# Patient Record
Sex: MALE | Race: WHITE | Employment: FULL TIME | URBAN - METROPOLITAN AREA
[De-identification: names, ages, dates, MRNs, and addresses within clinical notes are randomized per-mention and may not be internally consistent; named-entity substitution may affect disease eponyms.]

---

## 2018-01-18 ENCOUNTER — ED HISTORICAL/CONVERTED ENCOUNTER (OUTPATIENT)
Dept: OTHER | Age: 38
End: 2018-01-18

## 2021-09-28 ENCOUNTER — OFFICE VISIT (OUTPATIENT)
Dept: HEMATOLOGY | Age: 41
End: 2021-09-28
Payer: COMMERCIAL

## 2021-09-28 VITALS
TEMPERATURE: 98.6 F | OXYGEN SATURATION: 97 % | RESPIRATION RATE: 22 BRPM | BODY MASS INDEX: 35.98 KG/M2 | SYSTOLIC BLOOD PRESSURE: 155 MMHG | DIASTOLIC BLOOD PRESSURE: 90 MMHG | WEIGHT: 257 LBS | HEIGHT: 71 IN | HEART RATE: 90 BPM

## 2021-09-28 DIAGNOSIS — R79.89 ELEVATED LFTS: Primary | ICD-10-CM

## 2021-09-28 DIAGNOSIS — E83.19 IRON OVERLOAD: ICD-10-CM

## 2021-09-28 PROCEDURE — 99204 OFFICE O/P NEW MOD 45 MIN: CPT | Performed by: NURSE PRACTITIONER

## 2021-09-28 NOTE — PROGRESS NOTES
Tanvi Spears is a 46 y.o. male    Chief Complaint   Patient presents with    New Patient     1. Have you been to the ER, urgent care clinic since your last visit? Hospitalized since your last visit? No     2. Have you seen or consulted any other health care providers outside of the [address] [place] [address] since your last visit? Include any pap smears or colon screening.   No     Visit Vitals  BP (!) 155/90   Pulse 90   Temp 98.6 °F (37 °C)   Resp 22   Ht 5' 11\" (1.803 m)   Wt 257 lb (116.6 kg)   SpO2 97%   BMI 35.84 kg/m²

## 2021-09-28 NOTE — PROGRESS NOTES
Syed Frederick MD, Caprice Connors MD      [facility], PAESPINOZA Arredondo, ACNP-BC     April S Antonella, [facility]   MADISON Evans, [facility]       Gaston [facility] 136    at [place] [address], [address] Nila Bearden  22.    [phone]    FAX: [address] [place] [address], [address]    [phone]    FAX: [phone]     Patient Care Team:  Yolie Betancourt as PCP - General (Physician Assistant)  Yolie Betancourt as PCP - [facility] Denise Provider  [facility], Not On File, MD    Patient Active Problem List   Diagnosis Code    Olecranon bursitis of right elbow M70.21    Hidradenitis L73.2     The clinicians listed above have asked me to see Kimberly Berumen in consultation regarding elevated liver enzymes and its management. No medical records were available for review when the patient was here for the appointment. The patient is a 46 y.o.  male who was found to have elevated liver enzymes in 8/2021. The patient states they were elevated, checked again and down a little. Patient states he was told his iron was high and sent here. Serologic evaluation for markers of chronic liver disease has either not been performed or the results are not available. Imaging of the liver was either not performed or the results are not available to me. An assessment of liver fibrosis with biopsy or elastography has not been performed. The patient had not started any new medications within 3 months preceding the elevation in liver chemistries. The patient has no symptoms which can be attributed to the liver disorder.     The patient has not experienced the following symptoms: pain in the right side over the liver, yellowing of the eyes or skin, itching or swelling of the abdomen. The patient completes all daily activities without any functional limitations. ASSESSMENT AND PLAN:  Elevated liver enzymes  Persistent elevation in of unclear etiology at this time. Serologic testing for causes of chronic liver disease was ordered. Will perform laboratory testing to monitor liver function and degree of liver injury. This included BMP, hepatic panel, CBC with platelet count and INR. Will perform imaging of the liver with ultrasound. The need to perform an assessment of liver fibrosis was discussed with the patient. The FibroScan can assess liver fibrosis and determine if a patient has advanced fibrosis or cirrhosis without the need for liver biopsy. This will be performed at the next office visit. If the FibroScan suggests advanced fibrosis then a liver biopsy should be considered. The FibroScan can be repeated annually or as often as clinically indicated to assess for fibrosis progression and/or regression. If the liver enzymes remain persistently elevated over the next 1-2 years a liver biopsy should be performed to ensure there is no ongoing chronic liver disease. Screening for hepatocellular carcinoma  HCC screening is not necessary if the patient has no evidence of cirrhosis. Treatment of other medical problems in patients with chronic liver disease  There are no contraindications for the patient to take most medications necessary for treatment of other medical issues. The patient can take any medications utilized for treatment of DM and/or statins to treat hypercholesterolemia. The patient does not consume alcohol on a daily basis.  Normal doses of acetaminophen, as recommended on the label of the bottle, are not hepatotoxic except in the setting of daily alcohol use, even in patients with cirrhosis and can be utilized for pain.    Counseling for alcohol in patients with chronic liver disease  The patient was counseled regarding alcohol consumption and the effect of alcohol on chronic liver disease. The patient does not consume any significant amount of alcohol. Vaccinations   The need for vaccination against viral hepatitis A and B will be assessed with serologic and instituted as appropriate. Routine vaccinations against other bacterial and viral agents can be performed as indicated. Annual flu vaccination should be administered if indicated. No Known Allergies  No current outpatient medications on file prior to visit. No current facility-administered medications on file prior to visit. FAMILY HISTORY:  His parents both  when he was young and he is unaware of any genetic or liver problems. SOCIAL HISTORY:  The patient has a long term girl friend. The patient has no children. The patient smokes 1 1/2 - 2 PPD. The patient drinks 0-2 beers/day. The patient currently works full time as a . PHYSICAL EXAMINATION:  Visit Vitals  BP (!) 155/90   Pulse 90   Temp 98.6 °F (37 °C)   Resp 22   Ht 5' 11\" (1.803 m)   Wt 257 lb (116.6 kg)   SpO2 97%   BMI 35.84 kg/m²     General: No acute distress. Eyes: Sclera anicteric. ENT: No oral lesions. Nodes: No adenopathy. Skin: No spider angiomata. No jaundice. No palmar erythema. Respiratory: Lungs clear to auscultation. Cardiovascular: Regular heart rate. No murmurs. No JVD. Abdomen: Soft non-tender. Liver size normal to percussion/palpation. Spleen not palpable. No obvious ascites. Extremities: No edema. No muscle wasting. No gross arthritic changes. Neurologic: Alert and oriented. Cranial nerves grossly intact. No asterixis. LABORATORY STUDIES:  Recent liver function panel, CBC with platelet count and BMP are not available. These studies will be performed. SEROLOGIES:  Not available or performed.  Testing was performed today.    LIVER HISTOLOGY:  Not available or performed    ENDOSCOPIC PROCEDURES:  Not available or performed    RADIOLOGY:  Not available or performed    OTHER TESTING:  Not available or performed    FOLLOW-UP:  All of the issues listed above in the assessment and plan were discussed with the patient. All questions were answered. The patient expressed a clear understanding of the above. [address] [place] [id number] in 4 weeks for FibroScan, to review all data and determine the treatment plan.     Wyatt Jhaveri [facility] Services[facility]-BC  Liver [place] [address], [address] [address]  22.  [phone]

## 2021-09-29 LAB
ACE SERPL-CCNC: 47 U/L (ref 14–82)
ALBUMIN SERPL-MCNC: 4.6 G/DL (ref 4–5)
ALP SERPL-CCNC: 101 IU/L (ref 44–121)
ALT SERPL-CCNC: 55 IU/L (ref 0–44)
AST SERPL-CCNC: 46 IU/L (ref 0–40)
BILIRUB DIRECT SERPL-MCNC: 0.17 MG/DL (ref 0–0.4)
BILIRUB SERPL-MCNC: 0.6 MG/DL (ref 0–1.2)
BUN SERPL-MCNC: 10 MG/DL (ref 6–20)
BUN/CREAT SERPL: 11 (ref 9–20)
CALCIUM SERPL-MCNC: 9.5 MG/DL (ref 8.7–10.2)
CERULOPLASMIN SERPL-MCNC: 16.3 MG/DL (ref 16–31)
CHLORIDE SERPL-SCNC: 102 MMOL/L (ref 96–106)
CO2 SERPL-SCNC: 23 MMOL/L (ref 20–29)
CREAT SERPL-MCNC: 0.89 MG/DL (ref 0.76–1.27)
ERYTHROCYTE [DISTWIDTH] IN BLOOD BY AUTOMATED COUNT: 13.1 % (ref 11.6–15.4)
FERRITIN SERPL-MCNC: 1870 NG/ML (ref 30–400)
GLUCOSE SERPL-MCNC: 160 MG/DL (ref 65–99)
HAV AB SER QL: POSITIVE
HBV CORE AB SERPL QL IA: NEGATIVE
HBV SURFACE AB SER QL: NON REACTIVE
HBV SURFACE AG SERPL QL IA: NEGATIVE
HCT VFR BLD AUTO: 44.7 % (ref 37.5–51)
HGB BLD-MCNC: 15.1 G/DL (ref 13–17.7)
INR PPP: 1 (ref 0.9–1.2)
IRON SATN MFR SERPL: >94 % (ref 15–55)
IRON SERPL-MCNC: 268 UG/DL (ref 38–169)
MCH RBC QN AUTO: 33.4 PG (ref 26.6–33)
MCHC RBC AUTO-ENTMCNC: 33.8 G/DL (ref 31.5–35.7)
MCV RBC AUTO: 99 FL (ref 79–97)
PLATELET # BLD AUTO: 228 X10E3/UL (ref 150–450)
POTASSIUM SERPL-SCNC: 4.1 MMOL/L (ref 3.5–5.2)
PROT SERPL-MCNC: 7.1 G/DL (ref 6–8.5)
PROTHROMBIN TIME: 10.3 SEC (ref 9.1–12)
RBC # BLD AUTO: 4.52 X10E6/UL (ref 4.14–5.8)
SODIUM SERPL-SCNC: 141 MMOL/L (ref 134–144)
TIBC SERPL-MCNC: <285 UG/DL (ref 250–450)
UIBC SERPL-MCNC: <17 UG/DL (ref 111–343)
WBC # BLD AUTO: 8.1 X10E3/UL (ref 3.4–10.8)

## 2021-09-30 LAB
ACTIN IGG SERPL-ACNC: 4 UNITS (ref 0–19)
C-ANCA TITR SER IF: NORMAL TITER
MITOCHONDRIA M2 IGG SER-ACNC: <20 UNITS (ref 0–20)
MYELOPEROXIDASE AB SER IA-ACNC: <9 U/ML (ref 0–9)
P-ANCA ATYPICAL TITR SER IF: NORMAL TITER
P-ANCA TITR SER IF: NORMAL TITER
PROTEINASE3 AB SER IA-ACNC: <3.5 U/ML (ref 0–3.5)

## 2021-10-07 ENCOUNTER — HOSPITAL ENCOUNTER (OUTPATIENT)
Dept: ULTRASOUND IMAGING | Age: 41
Discharge: HOME | End: 2021-10-07
Attending: NURSE PRACTITIONER
Payer: COMMERCIAL

## 2021-10-07 DIAGNOSIS — R79.89 ELEVATED LFTS: ICD-10-CM

## 2021-10-07 LAB
LAB DIRECTOR NAME PROVIDER: NORMAL
SERPINA1 GENE MUT ANL BLD/T: NORMAL
SERPINA1 GENE MUT TESTED BLD/T: NORMAL

## 2021-10-07 PROCEDURE — 76700 US EXAM ABDOM COMPLETE: CPT

## 2021-10-21 ENCOUNTER — TELEPHONE (OUTPATIENT)
Dept: HEMATOLOGY | Age: 41
End: 2021-10-21

## 2021-11-18 ENCOUNTER — OFFICE VISIT (OUTPATIENT)
Dept: HEMATOLOGY | Age: 41
End: 2021-11-18
Payer: COMMERCIAL

## 2021-11-18 VITALS
TEMPERATURE: 96.8 F | RESPIRATION RATE: 17 BRPM | OXYGEN SATURATION: 98 % | WEIGHT: 268 LBS | DIASTOLIC BLOOD PRESSURE: 89 MMHG | SYSTOLIC BLOOD PRESSURE: 140 MMHG | HEART RATE: 83 BPM | HEIGHT: 71 IN | BODY MASS INDEX: 37.52 KG/M2

## 2021-11-18 DIAGNOSIS — R79.89 ELEVATED LFTS: Primary | ICD-10-CM

## 2021-11-18 PROCEDURE — 99215 OFFICE O/P EST HI 40 MIN: CPT | Performed by: NURSE PRACTITIONER

## 2021-11-18 NOTE — PROGRESS NOTES
Stacey Nash MD, Lorrayne Closs, Chipper Battle, MD Malen Linker, PAESPINOZA Patel, [facility]-BC     Poornima Berman, [facility]   Maren Goncalves [facility]-ABRAHAN Castillo, [facility]       Gaston Connolly [facility] 136    at [address], [facility] Nila Simon  22.    [phone]    FAX: [address] [place] [address], [address]    [phone]    FAX: [phone]     Patient Care Team:  Yolie Ledezma as PCP - General (Physician Assistant)  Yolie Ledezma as PCP - [address] [place], [place] [address]reynaldo [facility] Provider  [facility], Not On File, MD    Patient Active Problem List   Diagnosis Code    Olecranon bursitis of right elbow M70.21    Hidradenitis L73.2    Elevated LFTs R79.89     Denny Pantoja is being seen at [address] for management of elevated liver enzymes. The active problem list, all pertinent past medical history, medications, radiologic findings and laboratory findings related to the liver disorder were reviewed and discussed with the patient. HFE was ordered after initial labs resulted and patient states he had it drawn at [facility] but no result in [employer] site. The patient is a 46 y.o.  male who was found to have elevated liver enzymes in 8/2021. The patient has no symptoms which can be attributed to the liver disorder. The patient has not experienced the following symptoms: pain in the right side over the liver, yellowing of the eyes or skin, itching or swelling of the abdomen. The patient completes all daily activities without any functional limitations. ASSESSMENT AND PLAN:  Elevated liver enzymes  Persistent elevation in of unclear etiology at this time. I have a suspicion he has hemachromatosis.  I explained the treatment and what to expect. I will re-order the HFE and get it drawn today. I advised him I will call him and review results if positive and that we will start weekly phlebotomy and how we will monitor it. Explained the reason for doing this. Serologic testing for causes of chronic liver disease indicated high iron and ferritin (>1000). HFE was ordered the next day, completed by patient but not resulted. Have performed laboratory testing to monitor liver function and degree of liver injury. This included BMP, hepatic panel, CBC with platelet count and INR. The need to perform an assessment of liver fibrosis was discussed with the patient. The FibroScan can assess liver fibrosis and determine if a patient has advanced fibrosis or cirrhosis without the need for liver biopsy. I will do this at a later date. Will not  and may be higher than normal due to iron deposition and active inflammation. If the FibroScan suggests advanced fibrosis then a liver biopsy should be considered. The FibroScan can be repeated annually or as often as clinically indicated to assess for fibrosis progression and/or regression. Counseling for diet and weight loss in patients with confirmed or suspected NAFLD  The patient was counseled regarding diet and exercise to achieve weight loss. The best diet for patients with fatty liver is one very low in carbohydrates and enriched with protein such as an Garo[employer] program. This was discussed in detail and a handout was provided. He is going to work on changing his meal planning and substituting healthier snacks/food choices for current ones. He needs to work on weight loss. Increase exercise, especially since his job is sedentary. The patient was told not to consume any food products and drinks containing fructose as this enhances hepatic fat synthesis. There is no medication or vitamin supplements we advocate for ARAYA.  Using glitazones in patients without diabetes mellitus has been shown to reduce fat content in the liver but has no effect on fibrosis and is associated with weight gain. Vitamin E has also been used but the data is not very good and most experts no longer advocate this. Screening for hepatocellular carcinoma  HCC screening is not necessary if the patient has no evidence of cirrhosis. Treatment of other medical problems in patients with chronic liver disease  There are no contraindications for the patient to take most medications necessary for treatment of other medical issues. The patient can take any medications utilized for treatment of DM and/or statins to treat hypercholesterolemia. The patient does not consume alcohol on a daily basis. Normal doses of acetaminophen, as recommended on the label of the bottle, are not hepatotoxic except in the setting of daily alcohol use, even in patients with cirrhosis and can be utilized for pain. Counseling for alcohol in patients with chronic liver disease  The patient was counseled regarding alcohol consumption and the effect of alcohol on chronic liver disease. The patient does not consume any significant amount of alcohol. Vaccinations   The need for vaccination against viral hepatitis A and B will be assessed with serologic and instituted as appropriate. Routine vaccinations against other bacterial and viral agents can be performed as indicated. Annual flu vaccination should be administered if indicated. No Known Allergies  No current outpatient medications on file prior to visit. No current facility-administered medications on file prior to visit. FAMILY HISTORY:  His parents both  when he was young and he is unaware of any genetic or liver problems. SOCIAL HISTORY:  The patient has a long term girl friend. The patient has no children. The patient smokes 1 1/2 - 2 PPD. The patient drinks 0-2 beers/day.   The patient currently works full time as a . PHYSICAL EXAMINATION:  Visit Vitals  BP (!) 140/89   Pulse 83   Temp 96.8 °F (36 °C) (Temporal)   Resp 17   Ht 5' 11\" (1.803 m)   Wt 268 lb (121.6 kg)   SpO2 98%   BMI 37.38 kg/m²     General: No acute distress. Obese. Eyes: Sclera anicteric. ENT: No oral lesions. Nodes: No adenopathy. Skin: No spider angiomata. No jaundice. No palmar erythema. Respiratory: Lungs clear to auscultation. Cardiovascular: Regular heart rate. No murmurs. No JVD. Abdomen: Soft non-tender. Liver size normal to percussion/palpation. Spleen not palpable. No obvious ascites. Extremities: No edema. No muscle wasting. No gross arthritic changes. Neurologic: Alert and oriented. Cranial nerves grossly intact. No asterixis. LABORATORY STUDIES:  Liver [place] of [address] Units 9/28/2021   WBC 3.4 - 10.8 x10E3/uL 8.1   HGB 13.0 - 17.7 g/dL 15.1    - 450 x10E3/uL 228   INR 0.9 - 1.2 1.0   AST 0 - 40 IU/L 46 (H)   ALT 0 - 44 IU/L 55 (H)   Alk Phos 44 - 121 IU/L 101   Bili, Total 0.0 - 1.2 mg/dL 0.6   Bili, Direct 0.00 - 0.40 mg/dL 0.17   Albumin 4.0 - 5.0 g/dL 4.6   BUN 6 - 20 mg/dL 10   Creat 0.76 - 1.27 mg/dL 0.89   Na 134 - 144 mmol/L 141   K 3.5 - 5.2 mmol/L 4.1   Cl 96 - 106 mmol/L 102   CO2 20 - 29 mmol/L 23   Glucose 65 - 99 mg/dL 160 (H)     SEROLOGIES:  Serologies Latest Ref Rng & Units 9/28/2021   Hep A Ab, Total Negative Positive (A)   Hep B Surface Ag Negative Negative   Hep B Core Ab, Total Negative Negative   Hep B Surface AB QL  Non Reactive   Ferritin 30 - 400 ng/mL 1,870 (H)   Iron % Saturation 15 - 55 % >94 (HH)   C-ANCA Neg:<1:20 titer <1:20   P-ANCA Neg:<1:20 titer <1:20   ANCA Neg:<1:20 titer <1:20   ASMCA 0 - 19 Units 4   M2 Ab 0.0 - 20.0 Units <20.0   Ceruloplasmin 16.0 - 31.0 mg/dL 16.3     LIVER HISTOLOGY:  Not available or performed    ENDOSCOPIC PROCEDURES:  Not available or performed    RADIOLOGY:  10/7/2021. Abdominal ultrasound.  Mild to moderate hepatic steatosis. OTHER TESTING:  Not available or performed    FOLLOW-UP:  All of the issues listed above in the assessment and plan were discussed with the patient. All questions were answered. The patient expressed a clear understanding of the above. [address] in 5 weeks. Overbook. Rest of management may need to be via phone and mailing labs.      ABISAI Corrigan-BC  Liver [place] [address], [id number] [place] [address], [facility] 22. [phone]

## 2021-11-18 NOTE — PROGRESS NOTES
Identified pt with two pt identifiers(name and ). Reviewed record in preparation for visit and have obtained necessary documentation. Chief Complaint   Patient presents with    Elevated Liver Enzymes     4 week f/u      Vitals:    21 1405 21 1412   BP: (!) 137/101 (!) 140/89   Pulse: 83    Resp: 17    Temp: 96.8 °F (36 °C)    TempSrc: Temporal    SpO2: 98%    Weight: 268 lb (121.6 kg)    Height: 5' 11\" (1.803 m)    PainSc:   0 - No pain        Health Maintenance Review: Patient reminded of \"due or due soon\" health maintenance. I have asked the patient to contact his/her primary care provider (PCP) for follow-up on his/her health maintenance. Coordination of Care Questionnaire:  :   1) Have you been to an emergency room, urgent care, or hospitalized since your last visit? If yes, where when, and reason for visit? no       2. Have seen or consulted any other health care provider since your last visit? If yes, where when, and reason for visit? NO      Patient is accompanied by self I have received verbal consent from Reina Wu to discuss any/all medical information while they are present in the room.

## 2021-11-24 LAB — HFE GENE MUT ANL BLD/T: NORMAL

## 2021-11-30 ENCOUNTER — TELEPHONE (OUTPATIENT)
Dept: HEMATOLOGY | Age: 41
End: 2021-11-30

## 2021-11-30 NOTE — TELEPHONE ENCOUNTER
[email or web address] Faxed orders over to [facility] infusion center for weekly Phleb.  (KF)

## 2021-11-30 NOTE — TELEPHONE ENCOUNTER
----- Message from [address]. Susie Conrad NP sent at 11/30/2021  8:41 AM EST -----  Regarding: follow up  He will need a follow up appt in 4 weeks and every 4 weeks. I put in his letter we can do a phone visit with standing labs so he doesn't have to take a ton of time off of work. You can overbook him as a phone visit in 4 weeks. THANKS!!!!!!!!!!!!        [email or web address] Patient has scheduled appt for 12/20/21 in office. Joselin Eastman NP will place lab order in the system and I will mail to patient. (KF)

## 2021-12-06 ENCOUNTER — TELEPHONE (OUTPATIENT)
Dept: HEMATOLOGY | Age: 41
End: 2021-12-06

## 2021-12-06 DIAGNOSIS — E83.110 HEREDITARY HEMOCHROMATOSIS (HCC): Primary | ICD-10-CM

## 2021-12-07 ENCOUNTER — TELEPHONE (OUTPATIENT)
Dept: HEMATOLOGY | Age: 41
End: 2021-12-07

## 2021-12-07 NOTE — TELEPHONE ENCOUNTER
----- Message from [address]. Gen Mary NP sent at 12/6/2021  1:59 PM EST -----  Regarding: RE: follow up  Put in 8 sets of standing orders. ----- Message -----  From: Zahra Hayes RN  Sent: 11/30/2021   9:30 AM EST  To: [address]. Gen Mary NP  Subject: FW: follow up                                    Lab orders when you are back in office. Tunde Yousif  ----- Message -----  From: Anna Tejeda NP  Sent: 11/30/2021   8:42 AM EST  To: Jen Masters RN  Subject: follow up                                        He will need a follow up appt in 4 weeks and every 4 weeks. I put in his letter we can do a phone visit with standing labs so he doesn't have to take a ton of time off of work. You can overbook him as a phone visit in 4 weeks. THANKS!!!!!!!!!!!!      [email or web address] Patient scheduled for 12/20/21 & 1/20/22 for a VV appt. When in the office on 12/20/21 if Jan 2022 appt need to be changed it can be done at that time. (ASHLEY)

## 2021-12-16 ENCOUNTER — HOSPITAL ENCOUNTER (OUTPATIENT)
Dept: INFUSION THERAPY | Age: 41
Discharge: HOME | End: 2021-12-16
Payer: COMMERCIAL

## 2021-12-16 VITALS
DIASTOLIC BLOOD PRESSURE: 89 MMHG | SYSTOLIC BLOOD PRESSURE: 147 MMHG | HEART RATE: 91 BPM | TEMPERATURE: 97.2 F | RESPIRATION RATE: 18 BRPM

## 2021-12-16 LAB — HGB BLD-MCNC: 15.5 G/DL (ref 12.1–17)

## 2021-12-16 PROCEDURE — 36416 COLLJ CAPILLARY BLOOD SPEC: CPT

## 2021-12-16 PROCEDURE — 85018 HEMOGLOBIN: CPT

## 2021-12-16 PROCEDURE — 99195 PHLEBOTOMY: CPT

## 2021-12-16 NOTE — PROGRESS NOTES
[facility] Note:   Arrived - 1620    Therapeutic Phlebotomy 1/6    Patient denied having any symptoms of COVID-19, i.e. SOB, coughing, fever, or generally not feeling well. Also denies having been exposed to COVID-19 recently or having had any recent contact with family/friend that has a pending COVID test.    Patient Vitals for the past 12 hrs:   Temp Pulse Resp BP   12/16/21 1640 -- 91 18 (!) 147/89   12/16/21 1624 97.2 °F (36.2 °C) 96 18 (!) 149/92     Assessment - unremarkable    Recent Results (from the past 12 hour(s))   POC HEMOGLOBIN    Collection Time: 12/16/21  4:28 PM   Result Value Ref Range    Hemoglobin (POC) 15.5 12.1 - 17.0 g/dL       Therapeutic Phlebotomy - # 16 Gauge butterfly needle used to access L AC. 1 unit (approx -500ml) prbc's removed into blood pack bag over 5minutes. Needle removed. Hand-held pressure applied x 5 min. 2x2 applied to site and secured w/ coban. Tolerated well. Denies lightheadedness and dizziness. B/P remained stable. Snacks provided. Pt declined 30 minutes post phlebotomy observation - pt continues to deny dizziness & lightheadedness. BP re-checked after pt sitting up. Phlebotomy site dressing- dry & intact. Discharged ambulatory. Pt denies any acute problems/changes. Discharged from [facility] ambulatory. No distress.  Next appt: 12/23/21 at 1600

## 2021-12-23 ENCOUNTER — HOSPITAL ENCOUNTER (OUTPATIENT)
Dept: INFUSION THERAPY | Age: 41
Discharge: HOME | End: 2021-12-23
Payer: COMMERCIAL

## 2021-12-23 VITALS
DIASTOLIC BLOOD PRESSURE: 85 MMHG | SYSTOLIC BLOOD PRESSURE: 163 MMHG | OXYGEN SATURATION: 96 % | HEART RATE: 87 BPM | RESPIRATION RATE: 18 BRPM | TEMPERATURE: 98.1 F

## 2021-12-23 LAB
BASO+EOS+MONOS # BLD AUTO: 0.6 K/UL (ref 0.2–1.2)
BASO+EOS+MONOS NFR BLD AUTO: 6 % (ref 3.2–16.9)
DIFFERENTIAL METHOD BLD: NORMAL
ERYTHROCYTE [DISTWIDTH] IN BLOOD BY AUTOMATED COUNT: 13.1 % (ref 11.8–15.8)
HCT VFR BLD AUTO: 39.2 % (ref 36.6–50.3)
HGB BLD-MCNC: 13.8 G/DL (ref 12.1–17)
LYMPHOCYTES # BLD: 2.6 K/UL (ref 0.8–3.5)
LYMPHOCYTES NFR BLD: 28 % (ref 12–49)
MCH RBC QN AUTO: 33.7 PG (ref 26–34)
MCHC RBC AUTO-ENTMCNC: 35.2 G/DL (ref 30–36.5)
MCV RBC AUTO: 95.6 FL (ref 80–99)
NEUTS SEG # BLD: 6.1 K/UL (ref 1.8–8)
NEUTS SEG NFR BLD: 66 % (ref 32–75)
PLATELET # BLD AUTO: 210 K/UL (ref 150–400)
RBC # BLD AUTO: 4.1 M/UL (ref 4.1–5.7)
WBC # BLD AUTO: 9.3 K/UL (ref 4.1–11.1)

## 2021-12-23 PROCEDURE — 36415 COLL VENOUS BLD VENIPUNCTURE: CPT

## 2021-12-23 PROCEDURE — 99195 PHLEBOTOMY: CPT

## 2021-12-23 PROCEDURE — 85025 COMPLETE CBC W/AUTO DIFF WBC: CPT

## 2021-12-23 NOTE — PROGRESS NOTES
[facility] Note:   Arrived - 1710  Labs obtained: CBCap    Patient denied having any symptoms of COVID-19, i.e. SOB, coughing, fever, or generally not feeling well. Also denies having been exposed to COVID-19 recently or having had any recent contact with family/friend that has a pending COVID test.    Recent Results (from the past 12 hour(s))   CBC WITH 3 PART DIFF    Collection Time: 12/23/21  3:35 PM   Result Value Ref Range    WBC 9.3 4.1 - 11.1 K/uL    RBC 4.10 4. 10 - 5.70 M/uL    HGB 13.8 12.1 - 17.0 g/dL    HCT 39.2 36.6 - 50.3 %    MCV 95.6 80.0 - 99.0 FL    MCH 33.7 26.0 - 34.0 PG    MCHC 35.2 30.0 - 36.5 g/dL    RDW 13.1 11.8 - 15.8 %    PLATELET 769 869 - 956 K/uL    NEUTROPHILS 66 32 - 75 %    MIXED CELLS 6 3.2 - 16.9 %    LYMPHOCYTES 28 12 - 49 %    ABS. NEUTROPHILS 6.1 1.8 - 8.0 K/UL    ABS. MIXED CELLS 0.6 0.2 - 1.2 K/uL    ABS. LYMPHOCYTES 2.6 0.8 - 3.5 K/UL    DF AUTOMATED         Assessment - unremarkable    Therapeutic Phlebotomy - # 16 Gauge butterfly needle used to access L AC. 1 unit (approx -500ml) prbc's removed into blood pack bag over 10 minutes. Needle removed. Hand-held pressure applied x 5 min. 2x2 applied to site and secured w/ coban. Tolerated well. Denies lightheadedness and dizziness. B/P remained stable. Snacks offered. Pt declined 30 minutes post phlebotomy observation - pt continues to deny dizziness & lightheadedness. BP re-checked after pt sitting up. Phlebotomy site dressing- dry & intact. [facility] ambulatory. Pt denies any acute problems/changes. Discharged from [facility] ambulatory. No distress.  Next appt: 1/6/22 at 0

## 2021-12-30 ENCOUNTER — APPOINTMENT (OUTPATIENT)
Dept: INFUSION THERAPY | Age: 41
End: 2021-12-30

## 2022-01-06 ENCOUNTER — HOSPITAL ENCOUNTER (OUTPATIENT)
Dept: INFUSION THERAPY | Age: 42
Discharge: HOME | End: 2022-01-06
Payer: COMMERCIAL

## 2022-01-06 VITALS
OXYGEN SATURATION: 97 % | SYSTOLIC BLOOD PRESSURE: 158 MMHG | TEMPERATURE: 98 F | RESPIRATION RATE: 18 BRPM | HEART RATE: 95 BPM | DIASTOLIC BLOOD PRESSURE: 86 MMHG

## 2022-01-06 LAB
BASOPHILS # BLD: 0.1 K/UL (ref 0–0.1)
BASOPHILS NFR BLD: 1 % (ref 0–1)
DIFFERENTIAL METHOD BLD: ABNORMAL
EOSINOPHIL # BLD: 0.3 K/UL (ref 0–0.4)
EOSINOPHIL NFR BLD: 4 % (ref 0–7)
ERYTHROCYTE [DISTWIDTH] IN BLOOD BY AUTOMATED COUNT: 13.5 % (ref 11.5–14.5)
HCT VFR BLD AUTO: 37.4 % (ref 36.6–50.3)
HGB BLD-MCNC: 13.3 G/DL (ref 12.1–17)
IMM GRANULOCYTES # BLD AUTO: 0 K/UL (ref 0–0.04)
IMM GRANULOCYTES NFR BLD AUTO: 1 % (ref 0–0.5)
LYMPHOCYTES # BLD: 2.1 K/UL (ref 0.8–3.5)
LYMPHOCYTES NFR BLD: 26 % (ref 12–49)
MCH RBC QN AUTO: 33.7 PG (ref 26–34)
MCHC RBC AUTO-ENTMCNC: 35.6 G/DL (ref 30–36.5)
MCV RBC AUTO: 94.7 FL (ref 80–99)
MONOCYTES # BLD: 0.6 K/UL (ref 0–1)
MONOCYTES NFR BLD: 7 % (ref 5–13)
NEUTS SEG # BLD: 5 K/UL (ref 1.8–8)
NEUTS SEG NFR BLD: 61 % (ref 32–75)
NRBC # BLD: 0 K/UL (ref 0–0.01)
NRBC BLD-RTO: 0 PER 100 WBC
PLATELET # BLD AUTO: 191 K/UL (ref 150–400)
PMV BLD AUTO: 9.6 FL (ref 8.9–12.9)
RBC # BLD AUTO: 3.95 M/UL (ref 4.1–5.7)
WBC # BLD AUTO: 8.1 K/UL (ref 4.1–11.1)

## 2022-01-06 PROCEDURE — 99195 PHLEBOTOMY: CPT

## 2022-01-06 PROCEDURE — 85025 COMPLETE CBC W/AUTO DIFF WBC: CPT

## 2022-01-06 NOTE — PROGRESS NOTES
[facility] Note:   Arrived - [facility] obtained: CBC    Patient denied having any symptoms of COVID-19, i.e. SOB, coughing, fever, or generally not feeling well. Also denies having been exposed to COVID-19 recently or having had any recent contact with family/friend that has a pending COVID test.    Recent Results (from the past 12 hour(s))   CBC WITH AUTOMATED DIFF    Collection Time: 01/06/22  3:38 PM   Result Value Ref Range    WBC 8.1 4.1 - 11.1 K/uL    RBC 3.95 (L) 4.10 - 5.70 M/uL    HGB 13.3 12.1 - 17.0 g/dL    HCT 37.4 36.6 - 50.3 %    MCV 94.7 80.0 - 99.0 FL    MCH 33.7 26.0 - 34.0 PG    MCHC 35.6 30.0 - 36.5 g/dL    RDW 13.5 11.5 - 14.5 %    PLATELET 781 611 - 639 K/uL    MPV 9.6 8.9 - 12.9 FL    NRBC 0.0 0  WBC    ABSOLUTE NRBC 0.00 0.00 - 0.01 K/uL    NEUTROPHILS 61 32 - 75 %    LYMPHOCYTES 26 12 - 49 %    MONOCYTES 7 5 - 13 %    EOSINOPHILS 4 0 - 7 %    BASOPHILS 1 0 - 1 %    IMMATURE GRANULOCYTES 1 (H) 0.0 - 0.5 %    ABS. NEUTROPHILS 5.0 1.8 - 8.0 K/UL    ABS. LYMPHOCYTES 2.1 0.8 - 3.5 K/UL    ABS. MONOCYTES 0.6 0.0 - 1.0 K/UL    ABS. EOSINOPHILS 0.3 0.0 - 0.4 K/UL    ABS. BASOPHILS 0.1 0.0 - 0.1 K/UL    ABS. IMM. GRANS. 0.0 0.00 - 0.04 K/UL    DF AUTOMATED       Patient Vitals for the past 12 hrs:   Temp Pulse Resp BP SpO2   01/06/22 1556 -- 95 18 (!) 158/86 --   01/06/22 1536 98 °F (36.7 °C) 98 18 (!) 156/88 97 %     Assessment - unchanged  Therapeutic Phlebotomy - # 16 Gauge butterfly needle used to access L AC. 1 unit (approx -500ml) prbc's removed into blood pack bag over 7 minutes. Needle removed. Hand-held pressure applied x 5 min. 2x2 applied to site and secured w/ coban. Tolerated well. Denies lightheadedness and dizziness. B/P remained stable. Snacks offered. Pt declined 30 minutes post phlebotomy observation - pt continues to deny dizziness & lightheadedness. BP re-checked after pt sitting up. Phlebotomy site dressing- dry & intact. Discharged ambulatory. Pt denies any acute problems/changes. Discharged from [facility] ambulatory. No distress.  Next appt: 1/13/22 at 1600

## 2022-01-13 ENCOUNTER — HOSPITAL ENCOUNTER (OUTPATIENT)
Dept: INFUSION THERAPY | Age: 42
Discharge: HOME | End: 2022-01-13
Payer: COMMERCIAL

## 2022-01-13 VITALS
HEART RATE: 94 BPM | BODY MASS INDEX: 36.12 KG/M2 | OXYGEN SATURATION: 96 % | DIASTOLIC BLOOD PRESSURE: 84 MMHG | RESPIRATION RATE: 18 BRPM | SYSTOLIC BLOOD PRESSURE: 142 MMHG | TEMPERATURE: 98.7 F | WEIGHT: 259 LBS

## 2022-01-13 LAB
BASOPHILS # BLD: 0.1 K/UL (ref 0–0.1)
BASOPHILS NFR BLD: 1 % (ref 0–1)
DIFFERENTIAL METHOD BLD: ABNORMAL
EOSINOPHIL # BLD: 0.3 K/UL (ref 0–0.4)
EOSINOPHIL NFR BLD: 3 % (ref 0–7)
ERYTHROCYTE [DISTWIDTH] IN BLOOD BY AUTOMATED COUNT: 14 % (ref 11.5–14.5)
HCT VFR BLD AUTO: 38.5 % (ref 36.6–50.3)
HGB BLD-MCNC: 13.3 G/DL (ref 12.1–17)
IMM GRANULOCYTES # BLD AUTO: 0.1 K/UL (ref 0–0.04)
IMM GRANULOCYTES NFR BLD AUTO: 1 % (ref 0–0.5)
LYMPHOCYTES # BLD: 2.4 K/UL (ref 0.8–3.5)
LYMPHOCYTES NFR BLD: 22 % (ref 12–49)
MCH RBC QN AUTO: 33.7 PG (ref 26–34)
MCHC RBC AUTO-ENTMCNC: 34.5 G/DL (ref 30–36.5)
MCV RBC AUTO: 97.5 FL (ref 80–99)
MONOCYTES # BLD: 0.8 K/UL (ref 0–1)
MONOCYTES NFR BLD: 7 % (ref 5–13)
NEUTS SEG # BLD: 7.2 K/UL (ref 1.8–8)
NEUTS SEG NFR BLD: 67 % (ref 32–75)
NRBC # BLD: 0 K/UL (ref 0–0.01)
NRBC BLD-RTO: 0 PER 100 WBC
PLATELET # BLD AUTO: 208 K/UL (ref 150–400)
PMV BLD AUTO: 9.5 FL (ref 8.9–12.9)
RBC # BLD AUTO: 3.95 M/UL (ref 4.1–5.7)
WBC # BLD AUTO: 10.9 K/UL (ref 4.1–11.1)

## 2022-01-13 PROCEDURE — 85025 COMPLETE CBC W/AUTO DIFF WBC: CPT

## 2022-01-13 PROCEDURE — 99195 PHLEBOTOMY: CPT

## 2022-01-13 PROCEDURE — 36415 COLL VENOUS BLD VENIPUNCTURE: CPT

## 2022-01-13 NOTE — PROGRESS NOTES
[facility] Note: Therapeutic Phlebotomy    Arrived - 1555    Assessment - unremarkable    Labs Obtained - CBC w/ diff  Recent Results (from the past 12 hour(s))   CBC WITH AUTOMATED DIFF    Collection Time: 01/13/22  3:58 PM   Result Value Ref Range    WBC 10.9 4.1 - 11.1 K/uL    RBC 3.95 (L) 4.10 - 5.70 M/uL    HGB 13.3 12.1 - 17.0 g/dL    HCT 38.5 36.6 - 50.3 %    MCV 97.5 80.0 - 99.0 FL    MCH 33.7 26.0 - 34.0 PG    MCHC 34.5 30.0 - 36.5 g/dL    RDW 14.0 11.5 - 14.5 %    PLATELET 754 712 - 265 K/uL    MPV 9.5 8.9 - 12.9 FL    NRBC 0.0 0  WBC    ABSOLUTE NRBC 0.00 0.00 - 0.01 K/uL    NEUTROPHILS 67 32 - 75 %    LYMPHOCYTES 22 12 - 49 %    MONOCYTES 7 5 - 13 %    EOSINOPHILS 3 0 - 7 %    BASOPHILS 1 0 - 1 %    IMMATURE GRANULOCYTES 1 (H) 0.0 - 0.5 %    ABS. NEUTROPHILS 7.2 1.8 - 8.0 K/UL    ABS. LYMPHOCYTES 2.4 0.8 - 3.5 K/UL    ABS. MONOCYTES 0.8 0.0 - 1.0 K/UL    ABS. EOSINOPHILS 0.3 0.0 - 0.4 K/UL    ABS. BASOPHILS 0.1 0.0 - 0.1 K/UL    ABS. IMM. GRANS. 0.1 (H) 0.00 - 0.04 K/UL    DF AUTOMATED       Therapeutic Phlebotomy -  # 16 Gauge butterfly needle used to access left AC. 1 unit (approx -500ml) prbc's removed into blood pack bag over 7 minutes. Needle removed. Hand-held pressure applied x 5 min. 2x2 applied to site and secured w/ coban. Tolerated well. Denies lightheadedness and dizziness. B/P remained stable. Snacks provided. Patient declined to stay for observation after phlebotomy - pt continues to deny dizziness & lightheadedness. BP re-checked after pt sitting up. Phlebotomy site dressing- dry & intact. Patient Vitals for the past 12 hrs:   Temp Pulse Resp BP SpO2   01/13/22 1619 -- 94 18 (!) 142/84 --   01/13/22 1556 98.7 °F (37.1 °C) 96 18 (!) 140/80 96 %       1620- Discharged ambulatory. Pt denies any acute problems/changes. Discharged from [facility] ambulatory. No distress.  Next appt: 1/20/22

## 2022-01-19 PROBLEM — E83.110 HEREDITARY HEMOCHROMATOSIS (HCC): Status: ACTIVE | Noted: 2022-01-19

## 2022-01-20 ENCOUNTER — VIRTUAL VISIT (OUTPATIENT)
Dept: HEMATOLOGY | Age: 42
End: 2022-01-20

## 2022-01-20 ENCOUNTER — HOSPITAL ENCOUNTER (OUTPATIENT)
Dept: INFUSION THERAPY | Age: 42
Discharge: HOME | End: 2022-01-20
Payer: COMMERCIAL

## 2022-01-20 VITALS
HEART RATE: 104 BPM | RESPIRATION RATE: 18 BRPM | TEMPERATURE: 98.4 F | SYSTOLIC BLOOD PRESSURE: 144 MMHG | DIASTOLIC BLOOD PRESSURE: 94 MMHG

## 2022-01-20 DIAGNOSIS — E83.110 HEREDITARY HEMOCHROMATOSIS (HCC): Primary | ICD-10-CM

## 2022-01-20 LAB
BASO+EOS+MONOS # BLD AUTO: 0.7 K/UL (ref 0.2–1.2)
BASO+EOS+MONOS NFR BLD AUTO: 7 % (ref 3.2–16.9)
DIFFERENTIAL METHOD BLD: ABNORMAL
ERYTHROCYTE [DISTWIDTH] IN BLOOD BY AUTOMATED COUNT: 14.2 % (ref 11.8–15.8)
HCT VFR BLD AUTO: 38.9 % (ref 36.6–50.3)
HGB BLD-MCNC: 13.8 G/DL (ref 12.1–17)
LYMPHOCYTES # BLD: 2.6 K/UL (ref 0.8–3.5)
LYMPHOCYTES NFR BLD: 25 % (ref 12–49)
MCH RBC QN AUTO: 34.9 PG (ref 26–34)
MCHC RBC AUTO-ENTMCNC: 35.5 G/DL (ref 30–36.5)
MCV RBC AUTO: 98.5 FL (ref 80–99)
NEUTS SEG # BLD: 7.1 K/UL (ref 1.8–8)
NEUTS SEG NFR BLD: 68 % (ref 32–75)
PLATELET # BLD AUTO: 297 K/UL (ref 150–400)
RBC # BLD AUTO: 3.95 M/UL (ref 4.1–5.7)
WBC # BLD AUTO: 10.4 K/UL (ref 4.1–11.1)

## 2022-01-20 PROCEDURE — 85025 COMPLETE CBC W/AUTO DIFF WBC: CPT

## 2022-01-20 PROCEDURE — 36415 COLL VENOUS BLD VENIPUNCTURE: CPT

## 2022-01-20 PROCEDURE — 99214 OFFICE O/P EST MOD 30 MIN: CPT | Performed by: NURSE PRACTITIONER

## 2022-01-20 PROCEDURE — 99195 PHLEBOTOMY: CPT

## 2022-01-20 NOTE — PROGRESS NOTES
[facility] Note:   Arrived - [address] obtained: CBCap    Patient denied having any symptoms of COVID-19, i.e. SOB, coughing, fever, or generally not feeling well. Also denies having been exposed to COVID-19 recently or having had any recent contact with family/friend that has a pending COVID test.    Patient Vitals for the past 12 hrs:   Temp Pulse Resp BP   01/20/22 1612 -- (!) 104 18 (!) 144/94   01/20/22 1549 98.4 °F (36.9 °C) 94 18 (!) 147/76     Recent Results (from the past 12 hour(s))   CBC WITH 3 PART DIFF    Collection Time: 01/20/22  3:50 PM   Result Value Ref Range    WBC 10.4 4.1 - 11.1 K/uL    RBC 3.95 (L) 4.10 - 5.70 M/uL    HGB 13.8 12.1 - 17.0 g/dL    HCT 38.9 36.6 - 50.3 %    MCV 98.5 80.0 - 99.0 FL    MCH 34.9 (H) 26.0 - 34.0 PG    MCHC 35.5 30.0 - 36.5 g/dL    RDW 14.2 11.8 - 15.8 %    PLATELET 853 037 - 493 K/uL    NEUTROPHILS 68 32 - 75 %    MIXED CELLS 7 3.2 - 16.9 %    LYMPHOCYTES 25 12 - 49 %    ABS. NEUTROPHILS 7.1 1.8 - 8.0 K/UL    ABS. MIXED CELLS 0.7 0.2 - 1.2 K/uL    ABS. LYMPHOCYTES 2.6 0.8 - 3.5 K/UL    DF AUTOMATED         Assessment - unremarkable  Therapeutic Phlebotomy - # 16 Gauge butterfly needle used to access L AC. 1 unit (approx -500ml) prbc's removed into blood pack bag over 7 minutes. Needle removed. Hand-held pressure applied x 5 min. 2x2 applied to site and secured w/ coban. Tolerated well. Denies lightheadedness and dizziness. B/P remained stable. Snacks offered. 30 minutes post phlebotomy declined - pt continues to deny dizziness & lightheadedness. BP re-checked after pt sitting up. Phlebotomy site dressing- dry & intact. Discharged ambulatory. Pt denies any acute problems/changes. Discharged from [facility] ambulatory. No distress.  Next appt: 1/27/22 at 1600

## 2022-01-20 NOTE — Clinical Note
NOTIFICATION RETURN TO WORK / SCHOOL    1/20/2022 9:14 AM    Mr. Gama Hennessy  [address] [id number]      To Whom It May Concern:    Gama Hennessy is currently under the care of [address]. He will return to work/school on: ***    If there are questions or concerns please have the patient contact our office. Sincerely,      Erendira Toribio.  Cindi Pierre NP

## 2022-01-20 NOTE — PROGRESS NOTES
Dorita Sun is a 46 y.o. male  HIPAA verified by two patient identifiers. Health Maintenance Due   Topic    COVID-19 Vaccine (1)    Pneumococcal 0-64 years (1 of 2 - PPSV23)    Flu Vaccine (1)     Chief Complaint   Patient presents with    Follow-up     Patient-Reported Vitals 1/20/2022   Patient-Reported Weight 256lb       Pain Scale: /10  Pain Location:             1. Have you been to the ER, urgent care clinic since your last visit? Hospitalized since your last visit? No    2. Have you seen or consulted any other health care providers outside of the [address] [place] [address] since your last visit? Include any pap smears or colon screening.  No

## 2022-01-20 NOTE — PROGRESS NOTES
[address] [facility], [place], [address], [place], [place]      Sharan Dumont, PACHECO Mosley, [facility]     April S Antonella [facility]   MADISON [address], [facility]       Gaston Connolly [address]    at [facility]    [address], [address] Nila Bearden  22.    [phone]    FAX: [address] [place] [address], [address]    [phone]    FAX: [phone]     Patient Care Team:  Yolie Moser as PCP - General (Physician Assistant)  Yolie Moser as PCP - [facility]  Bsjelena, Not On File, MD    Patient Active Problem List   Diagnosis Code    Olecranon bursitis of right elbow M70.21    Hidradenitis L73.2    Elevated LFTs R79.89     VIRTUAL TELEHEALTH VISIT PERFORMED DUE TO COVID-19 EPIDEMIC    CONSENT:    Chino Herndon, was evaluated through a synchronous (real-time) audio-video encounter. The patient (or guardian if applicable) is aware this is a billable service, which includes applicable co-pays. This Virtual Visit was conducted with patient's (and/or legal guardian's) consent. The visit was conducted pursuant to the emergency declaration under the [facility], [address] [place] [address] authority and the [employer] and [employer] General Act. Patient identification was verified, and a caregiver was present when appropriate. The patient was located in a state where the provider was licensed to provide care. [facility]ino [facility] for management of hereditary hemochromatosis.  The active problem list, all pertinent past medical history, medications, radiologic findings and laboratory findings related to the liver disorder were reviewed and discussed with the patient. 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 The patient is a 46 y.o.  male who was found to have elevated liver enzymes in 8/2021. The patient has no symptoms which can be attributed to the liver disorder. The patient has not experienced the following symptoms: pain in the right side over the liver, yellowing of the eyes or skin, itching or swelling of the abdomen. The patient completes all daily activities without any functional limitations. He is tolerating weekly phleb. He has had 4 visits and is scheduled for tomorrow. ASSESSMENT AND PLAN:  Genetic hemochromatosis  The patient has 2 copies of C282Y. The patient has genetic hemochromatosis and either has or is at risk to develop FE overload in the liver and other organs. The ferritin is >1000. The risk of cirrhosis is high. A liver biopsy should be performed at this time if FibroScan suggests borderline cirrhosis. The patient should receive phlebotomy therapy. This was ordered after labs resulted. He has had 4 phlebotomy sessions. Ferritin and FE saturation should be monitored every 4 weeks to make certain adequate phlebotomy is being performed and the frequency does not need to be adjusted. The patient should continue phlebotomy every 1-2 weeks as tolerated until the ferritin is under 50. Once therapeutic phlebotomy therapy has been completed and the serum ferritin is down around 50, the patient can be a healthy blood donor if the liver enzymes are normal.    The need to perform an assessment of liver fibrosis was discussed with the patient. The FibroScan can assess liver fibrosis and determine if a patient has advanced fibrosis or cirrhosis without the need for liver biopsy. If the FibroScan suggests advanced fibrosis then a liver biopsy should be considered.     The FibroScan can be repeated annually or as often as clinically indicated to assess for fibrosis progression and/or regression. Counseling for diet and weight loss in patients with confirmed or suspected NAFLD  The patient was counseled regarding diet and exercise to achieve weight loss. The best diet for patients with fatty liver is one very low in carbohydrates and enriched with protein such as an Garo's program. He has been extremely busy with the snow. The patient was told not to consume any food products and drinks containing fructose as this enhances hepatic fat synthesis. There is no medication or vitamin supplements we advocate for ARAYA. Using glitazones in patients without diabetes mellitus has been shown to reduce fat content in the liver but has no effect on fibrosis and is associated with weight gain. Vitamin E has also been used but the data is not very good and most experts no longer advocate this. Screening for hepatocellular carcinoma  HCC screening is not necessary if the patient has no evidence of cirrhosis. Treatment of other medical problems in patients with chronic liver disease  There are no contraindications for the patient to take most medications necessary for treatment of other medical issues. The patient can take any medications utilized for treatment of DM and/or statins to treat hypercholesterolemia. The patient does not consume alcohol on a daily basis. Normal doses of acetaminophen, as recommended on the label of the bottle, are not hepatotoxic except in the setting of daily alcohol use, even in patients with cirrhosis and can be utilized for pain. Counseling for alcohol in patients with chronic liver disease  The patient was counseled regarding alcohol consumption and the effect of alcohol on chronic liver disease. The patient does not consume any significant amount of alcohol. Vaccinations   Recommend vaccination against viral hepatitis B. He has documented immunity to viral hepatitis A. Routine vaccinations against other bacterial and viral agents can be performed as indicated. Annual flu vaccination should be administered if indicated. No Known Allergies  No current outpatient medications on file prior to visit. No current facility-administered medications on file prior to visit. FAMILY HISTORY:  His parents both  when he was young and he is unaware of any genetic or liver problems. SOCIAL HISTORY:  The patient has a long term girl friend. The patient has no children. The patient smokes 1 1/2 - 2 PPD. The patient drinks 0-2 beers/day. The patient currently works full time as a . PHYSICAL EXAMINATION:  There were no vitals taken for this visit. General: No acute distress. Eyes: Sclera anicteric. ENT: No oral lesions. Nodes: No adenopathy. Skin: No spider angiomata. No jaundice. No palmar erythema. Respiratory: No wheezing, respiratory distress, cyanosis. Cardiovascular: No JVD. Abdomen: Appears soft with no obvious ascites. Extremities: No edema. No muscle wasting. No gross arthritic changes. Neurologic: Alert and oriented. Cranial nerves grossly intact. No asterixis.     LABORATORY STUDIES:  [place] [address] Units 2021   WBC 3.4 - 10.8 x10E3/uL 8.1   HGB 13.0 - 17.7 g/dL 15.1    - 450 x10E3/uL 228   INR 0.9 - 1.2 1.0   AST 0 - 40 IU/L 46 (H)   ALT 0 - 44 IU/L 55 (H)   Alk Phos 44 - 121 IU/L 101   Bili, Total 0.0 - 1.2 mg/dL 0.6   Bili, Direct 0.00 - 0.40 mg/dL 0.17   Albumin 4.0 - 5.0 g/dL 4.6   BUN 6 - 20 mg/dL 10   Creat 0.76 - 1.27 mg/dL 0.89   Na 134 - 144 mmol/L 141   K 3.5 - 5.2 mmol/L 4.1   Cl 96 - 106 mmol/L 102   CO2 20 - 29 mmol/L 23   Glucose 65 - 99 mg/dL 160 (H)     SEROLOGIES:  Serologies Latest Ref Rng & Units 2021   Hep A Ab, Total Negative Positive (A)   Hep B Surface Ag Negative Negative   Hep B Core Ab, Total Negative Negative   Hep B Surface AB QL  Non Reactive   Ferritin 30 - 400 ng/mL 1,870 (H)   Iron % Saturation 15 - 55 % >94 (HH)   C-ANCA Neg:<1:20 titer <1:20   P-ANCA Neg:<1:20 titer <1:20   ANCA Neg:<1:20 titer <1:20   ASMCA 0 - 19 Units 4   M2 Ab 0.0 - 20.0 Units <20.0   Ceruloplasmin 16.0 - 31.0 mg/dL 16.3     LIVER HISTOLOGY:  Not available or performed    ENDOSCOPIC PROCEDURES:  Not available or performed    RADIOLOGY:  10/7/2021. Abdominal ultrasound. Mild to moderate hepatic steatosis. OTHER TESTING:  Not available or performed    FOLLOW-UP:  All of the issues listed above in the assessment and plan were discussed with the patient. All questions were answered. The patient expressed a clear understanding of the above. [facility] can overbook. Will get FibroScan next in person visit.      Kaela Prieto, [facility]  Liver [facility] [address] [facility], [address] Nila Bearden  22.  [phone]

## 2022-01-27 ENCOUNTER — HOSPITAL ENCOUNTER (OUTPATIENT)
Dept: INFUSION THERAPY | Age: 42
Discharge: HOME | End: 2022-01-27
Payer: COMMERCIAL

## 2022-01-27 VITALS
WEIGHT: 263.8 LBS | HEART RATE: 93 BPM | RESPIRATION RATE: 18 BRPM | DIASTOLIC BLOOD PRESSURE: 82 MMHG | TEMPERATURE: 98.8 F | BODY MASS INDEX: 36.79 KG/M2 | SYSTOLIC BLOOD PRESSURE: 141 MMHG | OXYGEN SATURATION: 98 %

## 2022-01-27 LAB
ALBUMIN SERPL-MCNC: 3.6 G/DL (ref 3.5–5)
ALBUMIN/GLOB SERPL: 0.9 {RATIO} (ref 1.1–2.2)
ALP SERPL-CCNC: 99 U/L (ref 45–117)
ALT SERPL-CCNC: 61 U/L (ref 12–78)
ANION GAP SERPL CALC-SCNC: 4 MMOL/L (ref 5–15)
AST SERPL-CCNC: 43 U/L (ref 15–37)
BASOPHILS # BLD: 0.1 K/UL (ref 0–0.1)
BASOPHILS NFR BLD: 1 % (ref 0–1)
BILIRUB DIRECT SERPL-MCNC: 0.1 MG/DL (ref 0–0.2)
BILIRUB SERPL-MCNC: 0.5 MG/DL (ref 0.2–1)
BUN SERPL-MCNC: 10 MG/DL (ref 6–20)
BUN/CREAT SERPL: 12 (ref 12–20)
CALCIUM SERPL-MCNC: 9 MG/DL (ref 8.5–10.1)
CHLORIDE SERPL-SCNC: 106 MMOL/L (ref 97–108)
CO2 SERPL-SCNC: 27 MMOL/L (ref 21–32)
CREAT SERPL-MCNC: 0.84 MG/DL (ref 0.7–1.3)
DIFFERENTIAL METHOD BLD: ABNORMAL
EOSINOPHIL # BLD: 0.3 K/UL (ref 0–0.4)
EOSINOPHIL NFR BLD: 4 % (ref 0–7)
ERYTHROCYTE [DISTWIDTH] IN BLOOD BY AUTOMATED COUNT: 13.9 % (ref 11.5–14.5)
FERRITIN SERPL-MCNC: 626 NG/ML (ref 26–388)
GLOBULIN SER CALC-MCNC: 4 G/DL (ref 2–4)
GLUCOSE SERPL-MCNC: 133 MG/DL (ref 65–100)
HCT VFR BLD AUTO: 37 % (ref 36.6–50.3)
HGB BLD-MCNC: 12.9 G/DL (ref 12.1–17)
IMM GRANULOCYTES # BLD AUTO: 0 K/UL (ref 0–0.04)
IMM GRANULOCYTES NFR BLD AUTO: 1 % (ref 0–0.5)
IRON SATN MFR SERPL: 50 % (ref 20–50)
IRON SERPL-MCNC: 156 UG/DL (ref 35–150)
LYMPHOCYTES # BLD: 1.6 K/UL (ref 0.8–3.5)
LYMPHOCYTES NFR BLD: 22 % (ref 12–49)
MCH RBC QN AUTO: 34 PG (ref 26–34)
MCHC RBC AUTO-ENTMCNC: 34.9 G/DL (ref 30–36.5)
MCV RBC AUTO: 97.6 FL (ref 80–99)
MONOCYTES # BLD: 0.5 K/UL (ref 0–1)
MONOCYTES NFR BLD: 7 % (ref 5–13)
NEUTS SEG # BLD: 4.9 K/UL (ref 1.8–8)
NEUTS SEG NFR BLD: 65 % (ref 32–75)
NRBC # BLD: 0 K/UL (ref 0–0.01)
NRBC BLD-RTO: 0 PER 100 WBC
PLATELET # BLD AUTO: 217 K/UL (ref 150–400)
PMV BLD AUTO: 9.2 FL (ref 8.9–12.9)
POTASSIUM SERPL-SCNC: 4.1 MMOL/L (ref 3.5–5.1)
PROT SERPL-MCNC: 7.6 G/DL (ref 6.4–8.2)
RBC # BLD AUTO: 3.79 M/UL (ref 4.1–5.7)
SODIUM SERPL-SCNC: 137 MMOL/L (ref 136–145)
TIBC SERPL-MCNC: 310 UG/DL (ref 250–450)
WBC # BLD AUTO: 7.4 K/UL (ref 4.1–11.1)

## 2022-01-27 PROCEDURE — 85025 COMPLETE CBC W/AUTO DIFF WBC: CPT

## 2022-01-27 PROCEDURE — 82728 ASSAY OF FERRITIN: CPT

## 2022-01-27 PROCEDURE — 36415 COLL VENOUS BLD VENIPUNCTURE: CPT

## 2022-01-27 PROCEDURE — 80048 BASIC METABOLIC PNL TOTAL CA: CPT

## 2022-01-27 PROCEDURE — 83540 ASSAY OF IRON: CPT

## 2022-01-27 PROCEDURE — 99195 PHLEBOTOMY: CPT

## 2022-01-27 PROCEDURE — 80076 HEPATIC FUNCTION PANEL: CPT

## 2022-01-27 NOTE — PROGRESS NOTES
[facility] Note:    Hrmyiip - 4963  Assessment completed, no new concerns voiced. Patient denied having any symptoms of COVID-19, i.e. SOB, coughing, fever, or generally not feeling well. Also denies having been exposed to COVID-19 recently or having had any recent contact with family/friend that has a pending COVID test.     Labs drawn- CBC with diff, BMP, Hepatic Function Panel, Iron Profile, and Ferritin    Recent Results (from the past 12 hour(s))   CBC WITH AUTOMATED DIFF    Collection Time: 01/27/22  4:05 PM   Result Value Ref Range    WBC 7.4 4.1 - 11.1 K/uL    RBC 3.79 (L) 4.10 - 5.70 M/uL    HGB 12.9 12.1 - 17.0 g/dL    HCT 37.0 36.6 - 50.3 %    MCV 97.6 80.0 - 99.0 FL    MCH 34.0 26.0 - 34.0 PG    MCHC 34.9 30.0 - 36.5 g/dL    RDW 13.9 11.5 - 14.5 %    PLATELET 272 757 - 229 K/uL    MPV 9.2 8.9 - 12.9 FL    NRBC 0.0 0  WBC    ABSOLUTE NRBC 0.00 0.00 - 0.01 K/uL    NEUTROPHILS 65 32 - 75 %    LYMPHOCYTES 22 12 - 49 %    MONOCYTES 7 5 - 13 %    EOSINOPHILS 4 0 - 7 %    BASOPHILS 1 0 - 1 %    IMMATURE GRANULOCYTES 1 (H) 0.0 - 0.5 %    ABS. NEUTROPHILS 4.9 1.8 - 8.0 K/UL    ABS. LYMPHOCYTES 1.6 0.8 - 3.5 K/UL    ABS. MONOCYTES 0.5 0.0 - 1.0 K/UL    ABS. EOSINOPHILS 0.3 0.0 - 0.4 K/UL    ABS. BASOPHILS 0.1 0.0 - 0.1 K/UL    ABS. IMM. GRANS. 0.0 0.00 - 0.04 K/UL    DF AUTOMATED     HEPATIC FUNCTION PANEL    Collection Time: 01/27/22  4:05 PM   Result Value Ref Range    Protein, total 7.6 6.4 - 8.2 g/dL    Albumin 3.6 3.5 - 5.0 g/dL    Globulin 4.0 2.0 - 4.0 g/dL    A-G Ratio 0.9 (L) 1.1 - 2.2      Bilirubin, total 0.5 0.2 - 1.0 MG/DL    Bilirubin, direct 0.1 0.0 - 0.2 MG/DL    Alk.  phosphatase 99 45 - 117 U/L    AST (SGOT) 43 (H) 15 - 37 U/L    ALT (SGPT) 61 12 - 78 U/L   METABOLIC PANEL, BASIC    Collection Time: 01/27/22  4:05 PM   Result Value Ref Range    Sodium 137 136 - 145 mmol/L    Potassium 4.1 3.5 - 5.1 mmol/L    Chloride 106 97 - 108 mmol/L    CO2 27 21 - 32 mmol/L Anion gap 4 (L) 5 - 15 mmol/L    Glucose 133 (H) 65 - 100 mg/dL    BUN 10 6 - 20 MG/DL    Creatinine 0.84 0.70 - 1.30 MG/DL    BUN/Creatinine ratio 12 12 - 20      GFR est AA >60 >60 ml/min/1.73m2    GFR est non-AA >60 >60 ml/min/1.73m2    Calcium 9.0 8.5 - 10.1 MG/DL     See [facility] for pending labs at time of note. Therapeutic Phlebotomy -  # 16 Gauge butterfly needle used to access left AC. 1 unit (approx -500ml) prbc's removed into blood pack bag over 10 minutes. Needle removed. Hand-held pressure applied,  2x2 applied to site and secured w/ coban. Tolerated well. Denies lightheadedness and dizziness. B/P remained stable. Pt declined snack and drink. Phlebotomy site dressing- dry & intact. Pt declined to stay for 30 minute monitoring period. 36 Pt denies any acute problems/changes. Discharged from [facility] ambulatory. No distress. Next appt: 2/3/22 @ 1600.

## 2022-01-31 ENCOUNTER — TELEPHONE (OUTPATIENT)
Dept: HEMATOLOGY | Age: 42
End: 2022-01-31

## 2022-01-31 DIAGNOSIS — E83.110 HEREDITARY HEMOCHROMATOSIS (HCC): Primary | ICD-10-CM

## 2022-01-31 NOTE — TELEPHONE ENCOUNTER
mailed labs to patient with note to have done in one month per Maritza Grey NP            ----- Message from [address] [facility]. Pratima Killian NP sent at 1/31/2022  7:39 AM EST -----  Regarding: labs  Please mail pt labs to get done in 1 month. Thanks.

## 2022-02-03 ENCOUNTER — HOSPITAL ENCOUNTER (OUTPATIENT)
Dept: INFUSION THERAPY | Age: 42
End: 2022-02-03

## 2022-02-03 ENCOUNTER — TELEPHONE (OUTPATIENT)
Dept: HEMATOLOGY | Age: 42
End: 2022-02-03

## 2022-02-03 NOTE — TELEPHONE ENCOUNTER
[email or web address] Faxed order over to [facility] infusion center to have Phlebotomy continued.  (KF)

## 2022-02-10 ENCOUNTER — HOSPITAL ENCOUNTER (OUTPATIENT)
Dept: INFUSION THERAPY | Age: 42
Discharge: HOME | End: 2022-02-10
Payer: COMMERCIAL

## 2022-02-10 VITALS
HEART RATE: 102 BPM | RESPIRATION RATE: 18 BRPM | SYSTOLIC BLOOD PRESSURE: 157 MMHG | DIASTOLIC BLOOD PRESSURE: 82 MMHG | OXYGEN SATURATION: 97 % | TEMPERATURE: 98.2 F

## 2022-02-10 LAB
BASOPHILS # BLD: 0.1 K/UL (ref 0–0.1)
BASOPHILS NFR BLD: 1 % (ref 0–1)
DIFFERENTIAL METHOD BLD: ABNORMAL
EOSINOPHIL # BLD: 0.3 K/UL (ref 0–0.4)
EOSINOPHIL NFR BLD: 3 % (ref 0–7)
ERYTHROCYTE [DISTWIDTH] IN BLOOD BY AUTOMATED COUNT: 13.5 % (ref 11.5–14.5)
HCT VFR BLD AUTO: 43.2 % (ref 36.6–50.3)
HGB BLD-MCNC: 14.6 G/DL (ref 12.1–17)
IMM GRANULOCYTES # BLD AUTO: 0 K/UL (ref 0–0.04)
IMM GRANULOCYTES NFR BLD AUTO: 1 % (ref 0–0.5)
LYMPHOCYTES # BLD: 2.7 K/UL (ref 0.8–3.5)
LYMPHOCYTES NFR BLD: 31 % (ref 12–49)
MCH RBC QN AUTO: 33.6 PG (ref 26–34)
MCHC RBC AUTO-ENTMCNC: 33.8 G/DL (ref 30–36.5)
MCV RBC AUTO: 99.3 FL (ref 80–99)
MONOCYTES # BLD: 0.6 K/UL (ref 0–1)
MONOCYTES NFR BLD: 7 % (ref 5–13)
NEUTS SEG # BLD: 4.8 K/UL (ref 1.8–8)
NEUTS SEG NFR BLD: 57 % (ref 32–75)
NRBC # BLD: 0 K/UL (ref 0–0.01)
NRBC BLD-RTO: 0 PER 100 WBC
PLATELET # BLD AUTO: 251 K/UL (ref 150–400)
PMV BLD AUTO: 9.4 FL (ref 8.9–12.9)
RBC # BLD AUTO: 4.35 M/UL (ref 4.1–5.7)
WBC # BLD AUTO: 8.5 K/UL (ref 4.1–11.1)

## 2022-02-10 PROCEDURE — 99195 PHLEBOTOMY: CPT

## 2022-02-10 PROCEDURE — 85025 COMPLETE CBC W/AUTO DIFF WBC: CPT

## 2022-02-10 PROCEDURE — 36415 COLL VENOUS BLD VENIPUNCTURE: CPT

## 2022-02-10 NOTE — PROGRESS NOTES
[facility] Note: Therapeutic Phlebotomy    Arrived - 1520  Assessment - unchanged     Patient denied having any symptoms of COVID-19, i.e. SOB, coughing, fever, or generally not feeling well. Also denies having been exposed to COVID-19 recently or having had any recent contact with family/friend that has a pending COVID test.     Labs Obtained - CBC w/ diff  Recent Results (from the past 12 hour(s))   CBC WITH AUTOMATED DIFF    Collection Time: 02/10/22  3:24 PM   Result Value Ref Range    WBC 8.5 4.1 - 11.1 K/uL    RBC 4.35 4.10 - 5.70 M/uL    HGB 14.6 12.1 - 17.0 g/dL    HCT 43.2 36.6 - 50.3 %    MCV 99.3 (H) 80.0 - 99.0 FL    MCH 33.6 26.0 - 34.0 PG    MCHC 33.8 30.0 - 36.5 g/dL    RDW 13.5 11.5 - 14.5 %    PLATELET 508 770 - 338 K/uL    MPV 9.4 8.9 - 12.9 FL    NRBC 0.0 0  WBC    ABSOLUTE NRBC 0.00 0.00 - 0.01 K/uL    NEUTROPHILS 57 32 - 75 %    LYMPHOCYTES 31 12 - 49 %    MONOCYTES 7 5 - 13 %    EOSINOPHILS 3 0 - 7 %    BASOPHILS 1 0 - 1 %    IMMATURE GRANULOCYTES 1 (H) 0.0 - 0.5 %    ABS. NEUTROPHILS 4.8 1.8 - 8.0 K/UL    ABS. LYMPHOCYTES 2.7 0.8 - 3.5 K/UL    ABS. MONOCYTES 0.6 0.0 - 1.0 K/UL    ABS. EOSINOPHILS 0.3 0.0 - 0.4 K/UL    ABS. BASOPHILS 0.1 0.0 - 0.1 K/UL    ABS. IMM. GRANS. 0.0 0.00 - 0.04 K/UL    DF AUTOMATED       Therapeutic Phlebotomy -  # 16 Gauge butterfly needle used to access left AC. 1 unit (approx -500ml) prbc's removed into blood pack bag over 5 minutes. Needle removed. Hand-held pressure applied x 5 min. 2x2 applied to site and secured w/ coban. Tolerated well. Denies lightheadedness and dizziness. B/P remained stable. Patient declines observation - pt continues to deny dizziness & lightheadedness. BP re-checked after pt sitting up. Phlebotomy site dressing- dry & intact.      Patient Vitals for the past 12 hrs:   Temp Pulse Resp BP SpO2   02/10/22 1542 -- (!) 102 18 (!) 157/82 --   02/10/22 1522 98.2 °F (36.8 °C) 99 18 (!) 157/85 97 %     1545- Discharged ambulatory. Pt denies any acute problems/changes. Discharged from [facility] ambulatory. No distress.  Next appt: 2/17/22

## 2022-02-17 ENCOUNTER — HOSPITAL ENCOUNTER (OUTPATIENT)
Dept: INFUSION THERAPY | Age: 42
Discharge: HOME | End: 2022-02-17
Payer: COMMERCIAL

## 2022-02-17 VITALS
BODY MASS INDEX: 36.29 KG/M2 | TEMPERATURE: 97.4 F | RESPIRATION RATE: 18 BRPM | WEIGHT: 260.2 LBS | HEART RATE: 113 BPM | SYSTOLIC BLOOD PRESSURE: 139 MMHG | DIASTOLIC BLOOD PRESSURE: 93 MMHG | OXYGEN SATURATION: 98 %

## 2022-02-17 LAB
BASOPHILS # BLD: 0.1 K/UL (ref 0–0.1)
BASOPHILS NFR BLD: 1 % (ref 0–1)
DIFFERENTIAL METHOD BLD: ABNORMAL
EOSINOPHIL # BLD: 0.3 K/UL (ref 0–0.4)
EOSINOPHIL NFR BLD: 3 % (ref 0–7)
ERYTHROCYTE [DISTWIDTH] IN BLOOD BY AUTOMATED COUNT: 13.2 % (ref 11.5–14.5)
HCT VFR BLD AUTO: 40.5 % (ref 36.6–50.3)
HGB BLD-MCNC: 14.1 G/DL (ref 12.1–17)
IMM GRANULOCYTES # BLD AUTO: 0 K/UL (ref 0–0.04)
IMM GRANULOCYTES NFR BLD AUTO: 0 % (ref 0–0.5)
LYMPHOCYTES # BLD: 2.6 K/UL (ref 0.8–3.5)
LYMPHOCYTES NFR BLD: 28 % (ref 12–49)
MCH RBC QN AUTO: 34.1 PG (ref 26–34)
MCHC RBC AUTO-ENTMCNC: 34.8 G/DL (ref 30–36.5)
MCV RBC AUTO: 97.8 FL (ref 80–99)
MONOCYTES # BLD: 0.6 K/UL (ref 0–1)
MONOCYTES NFR BLD: 7 % (ref 5–13)
NEUTS SEG # BLD: 5.8 K/UL (ref 1.8–8)
NEUTS SEG NFR BLD: 61 % (ref 32–75)
NRBC # BLD: 0 K/UL (ref 0–0.01)
NRBC BLD-RTO: 0 PER 100 WBC
PLATELET # BLD AUTO: 246 K/UL (ref 150–400)
PMV BLD AUTO: 9.5 FL (ref 8.9–12.9)
RBC # BLD AUTO: 4.14 M/UL (ref 4.1–5.7)
WBC # BLD AUTO: 9.4 K/UL (ref 4.1–11.1)

## 2022-02-17 PROCEDURE — 85025 COMPLETE CBC W/AUTO DIFF WBC: CPT

## 2022-02-17 PROCEDURE — 99195 PHLEBOTOMY: CPT

## 2022-02-17 PROCEDURE — 36415 COLL VENOUS BLD VENIPUNCTURE: CPT

## 2022-02-17 NOTE — PROGRESS NOTES
[facility] Note:    Arrived - 1600  Assessment - completed    Patient denied having any symptoms of COVID-19, i.e. SOB, coughing, fever, or generally not feeling well. Also denies having been exposed to COVID-19 recently or having had any recent contact with family/friend that has a pending COVID test.     CBC with diff drawn from right AC. Recent Results (from the past 12 hour(s))   CBC WITH AUTOMATED DIFF    Collection Time: 02/17/22  4:05 PM   Result Value Ref Range    WBC 9.4 4.1 - 11.1 K/uL    RBC 4.14 4.10 - 5.70 M/uL    HGB 14.1 12.1 - 17.0 g/dL    HCT 40.5 36.6 - 50.3 %    MCV 97.8 80.0 - 99.0 FL    MCH 34.1 (H) 26.0 - 34.0 PG    MCHC 34.8 30.0 - 36.5 g/dL    RDW 13.2 11.5 - 14.5 %    PLATELET 176 949 - 774 K/uL    MPV 9.5 8.9 - 12.9 FL    NRBC 0.0 0  WBC    ABSOLUTE NRBC 0.00 0.00 - 0.01 K/uL    NEUTROPHILS 61 32 - 75 %    LYMPHOCYTES 28 12 - 49 %    MONOCYTES 7 5 - 13 %    EOSINOPHILS 3 0 - 7 %    BASOPHILS 1 0 - 1 %    IMMATURE GRANULOCYTES 0 0.0 - 0.5 %    ABS. NEUTROPHILS 5.8 1.8 - 8.0 K/UL    ABS. LYMPHOCYTES 2.6 0.8 - 3.5 K/UL    ABS. MONOCYTES 0.6 0.0 - 1.0 K/UL    ABS. EOSINOPHILS 0.3 0.0 - 0.4 K/UL    ABS. BASOPHILS 0.1 0.0 - 0.1 K/UL    ABS. IMM. GRANS. 0.0 0.00 - 0.04 K/UL    DF AUTOMATED         Therapeutic Phlebotomy -  # 16 Gauge butterfly needle used to access left AC. 1 unit (approx -500ml) prbc's removed into blood pack bag over 10 minutes. Needle removed. Hand-held pressure applied. 2x2 applied to site and secured w/ coban. Tolerated well. Denies lightheadedness and dizziness. B/P remained stable. Patient Vitals for the past 12 hrs:   Temp Pulse Resp BP SpO2   02/17/22 1626 -- (!) 113 18 (!) 139/93 --   02/17/22 1558 97.4 °F (36.3 °C) (!) 103 18 (!) 146/90 98 %       1630 Pt denies any acute problems/changes. Pt declines to stay for further monitoring. Discharged from [facility] ambulatory. No distress. Next appt: 2/24/22 @ 1600.

## 2022-02-18 ENCOUNTER — APPOINTMENT (OUTPATIENT)
Dept: INFUSION THERAPY | Age: 42
End: 2022-02-18

## 2022-02-25 ENCOUNTER — TELEPHONE (OUTPATIENT)
Dept: HEMATOLOGY | Age: 42
End: 2022-02-25

## 2022-02-28 ENCOUNTER — TELEPHONE (OUTPATIENT)
Dept: HEMATOLOGY | Age: 42
End: 2022-02-28

## 2022-03-03 ENCOUNTER — HOSPITAL ENCOUNTER (OUTPATIENT)
Dept: INFUSION THERAPY | Age: 42
Discharge: HOME | End: 2022-03-03
Payer: COMMERCIAL

## 2022-03-03 VITALS
RESPIRATION RATE: 18 BRPM | SYSTOLIC BLOOD PRESSURE: 151 MMHG | TEMPERATURE: 98.7 F | HEART RATE: 92 BPM | OXYGEN SATURATION: 94 % | DIASTOLIC BLOOD PRESSURE: 96 MMHG

## 2022-03-03 LAB
BASOPHILS # BLD: 0.1 K/UL (ref 0–0.1)
BASOPHILS NFR BLD: 1 % (ref 0–1)
DIFFERENTIAL METHOD BLD: ABNORMAL
EOSINOPHIL # BLD: 0.3 K/UL (ref 0–0.4)
EOSINOPHIL NFR BLD: 3 % (ref 0–7)
ERYTHROCYTE [DISTWIDTH] IN BLOOD BY AUTOMATED COUNT: 12.9 % (ref 11.5–14.5)
HCT VFR BLD AUTO: 40.5 % (ref 36.6–50.3)
HGB BLD-MCNC: 14.3 G/DL (ref 12.1–17)
IMM GRANULOCYTES # BLD AUTO: 0 K/UL (ref 0–0.04)
IMM GRANULOCYTES NFR BLD AUTO: 0 % (ref 0–0.5)
LYMPHOCYTES # BLD: 1.9 K/UL (ref 0.8–3.5)
LYMPHOCYTES NFR BLD: 25 % (ref 12–49)
MCH RBC QN AUTO: 34.8 PG (ref 26–34)
MCHC RBC AUTO-ENTMCNC: 35.3 G/DL (ref 30–36.5)
MCV RBC AUTO: 98.5 FL (ref 80–99)
MONOCYTES # BLD: 0.5 K/UL (ref 0–1)
MONOCYTES NFR BLD: 7 % (ref 5–13)
NEUTS SEG # BLD: 4.8 K/UL (ref 1.8–8)
NEUTS SEG NFR BLD: 64 % (ref 32–75)
NRBC # BLD: 0 K/UL (ref 0–0.01)
NRBC BLD-RTO: 0 PER 100 WBC
PLATELET # BLD AUTO: 193 K/UL (ref 150–400)
PMV BLD AUTO: 9.6 FL (ref 8.9–12.9)
RBC # BLD AUTO: 4.11 M/UL (ref 4.1–5.7)
WBC # BLD AUTO: 7.6 K/UL (ref 4.1–11.1)

## 2022-03-03 PROCEDURE — 85025 COMPLETE CBC W/AUTO DIFF WBC: CPT

## 2022-03-03 PROCEDURE — 99195 PHLEBOTOMY: CPT

## 2022-03-03 PROCEDURE — 36415 COLL VENOUS BLD VENIPUNCTURE: CPT

## 2022-03-03 NOTE — PROGRESS NOTES
[facility] Note:   Arrived - 2227  Labs obtained: CBC    Patient denied having any symptoms of COVID-19, i.e. SOB, coughing, fever, or generally not feeling well. Also denies having been exposed to COVID-19 recently or having had any recent contact with family/friend that has a pending COVID test.    Patient Vitals for the past 12 hrs:   Temp Pulse Resp BP SpO2   03/03/22 1507 -- 92 18 (!) 151/96 --   03/03/22 1448 98.7 °F (37.1 °C) 91 18 (!) 165/83 94 %     Recent Results (from the past 12 hour(s))   CBC WITH AUTOMATED DIFF    Collection Time: 03/03/22  2:50 PM   Result Value Ref Range    WBC 7.6 4.1 - 11.1 K/uL    RBC 4.11 4.10 - 5.70 M/uL    HGB 14.3 12.1 - 17.0 g/dL    HCT 40.5 36.6 - 50.3 %    MCV 98.5 80.0 - 99.0 FL    MCH 34.8 (H) 26.0 - 34.0 PG    MCHC 35.3 30.0 - 36.5 g/dL    RDW 12.9 11.5 - 14.5 %    PLATELET 253 255 - 852 K/uL    MPV 9.6 8.9 - 12.9 FL    NRBC 0.0 0  WBC    ABSOLUTE NRBC 0.00 0.00 - 0.01 K/uL    NEUTROPHILS 64 32 - 75 %    LYMPHOCYTES 25 12 - 49 %    MONOCYTES 7 5 - 13 %    EOSINOPHILS 3 0 - 7 %    BASOPHILS 1 0 - 1 %    IMMATURE GRANULOCYTES 0 0.0 - 0.5 %    ABS. NEUTROPHILS 4.8 1.8 - 8.0 K/UL    ABS. LYMPHOCYTES 1.9 0.8 - 3.5 K/UL    ABS. MONOCYTES 0.5 0.0 - 1.0 K/UL    ABS. EOSINOPHILS 0.3 0.0 - 0.4 K/UL    ABS. BASOPHILS 0.1 0.0 - 0.1 K/UL    ABS. IMM. GRANS. 0.0 0.00 - 0.04 K/UL    DF AUTOMATED         Assessment - unremarkable except stye on L eye. Pt discussed performing phlebotomy on himself at home. This RN and another educated pt about dangers of this, and encouraged pt to have this procedure performed at the infusion center. Therapeutic Phlebotomy - # 16 Gauge butterfly needle used to access L AC. 1 unit (approx -500ml) prbc's removed into blood pack bag over 5 minutes. Needle removed. Hand-held pressure applied x 5 min. 2x2 applied to site and secured w/ coban. Tolerated well. Denies lightheadedness and dizziness. B/P remained stable. Snacks provided. Pt declined observation 30 minutes post phlebotomy - pt continues to deny dizziness & lightheadedness. BP re-checked after pt sitting up. Phlebotomy site dressing- dry & intact. 1510-Discharged ambulatory. Pt denies any acute problems/changes. Discharged from [facility] ambulatory. No distress.  Next appt: 3/10/22 at 1600

## 2022-03-10 ENCOUNTER — HOSPITAL ENCOUNTER (OUTPATIENT)
Dept: INFUSION THERAPY | Age: 42
Discharge: HOME | End: 2022-03-10

## 2022-03-10 NOTE — PROGRESS NOTES
[facility] VISIT NOTE      9729 Patient canceled appointment for Therapeutic Phlebotomy/lab today.     Ivet Stanton RN    Future Appointments   Date Time Provider Rah Garces   3/10/2022  4:00 PM JLUIS CHAUDHARY 2 [facility] REG   3/17/2022  4:00 PM JLUIS CHAUDHARY 6 [facility] REG   3/24/2022  4:00 PM JLUIS CHAUDHARY 4 [facility] REG

## 2022-03-17 ENCOUNTER — HOSPITAL ENCOUNTER (OUTPATIENT)
Dept: INFUSION THERAPY | Age: 42
Discharge: HOME | End: 2022-03-17
Payer: COMMERCIAL

## 2022-03-17 VITALS
RESPIRATION RATE: 18 BRPM | TEMPERATURE: 98 F | DIASTOLIC BLOOD PRESSURE: 95 MMHG | BODY MASS INDEX: 36.64 KG/M2 | SYSTOLIC BLOOD PRESSURE: 157 MMHG | HEART RATE: 101 BPM | WEIGHT: 262.7 LBS

## 2022-03-17 LAB
BASOPHILS # BLD: 0.1 K/UL (ref 0–0.1)
BASOPHILS NFR BLD: 1 % (ref 0–1)
DIFFERENTIAL METHOD BLD: ABNORMAL
EOSINOPHIL # BLD: 0.3 K/UL (ref 0–0.4)
EOSINOPHIL NFR BLD: 4 % (ref 0–7)
ERYTHROCYTE [DISTWIDTH] IN BLOOD BY AUTOMATED COUNT: 12.3 % (ref 11.5–14.5)
HCT VFR BLD AUTO: 42.7 % (ref 36.6–50.3)
HGB BLD-MCNC: 14.9 G/DL (ref 12.1–17)
IMM GRANULOCYTES # BLD AUTO: 0 K/UL (ref 0–0.04)
IMM GRANULOCYTES NFR BLD AUTO: 0 % (ref 0–0.5)
LYMPHOCYTES # BLD: 1.9 K/UL (ref 0.8–3.5)
LYMPHOCYTES NFR BLD: 25 % (ref 12–49)
MCH RBC QN AUTO: 34.3 PG (ref 26–34)
MCHC RBC AUTO-ENTMCNC: 34.9 G/DL (ref 30–36.5)
MCV RBC AUTO: 98.4 FL (ref 80–99)
MONOCYTES # BLD: 0.6 K/UL (ref 0–1)
MONOCYTES NFR BLD: 8 % (ref 5–13)
NEUTS SEG # BLD: 4.7 K/UL (ref 1.8–8)
NEUTS SEG NFR BLD: 62 % (ref 32–75)
NRBC # BLD: 0 K/UL (ref 0–0.01)
NRBC BLD-RTO: 0 PER 100 WBC
PLATELET # BLD AUTO: 228 K/UL (ref 150–400)
PMV BLD AUTO: 9.9 FL (ref 8.9–12.9)
RBC # BLD AUTO: 4.34 M/UL (ref 4.1–5.7)
WBC # BLD AUTO: 7.5 K/UL (ref 4.1–11.1)

## 2022-03-17 PROCEDURE — 36415 COLL VENOUS BLD VENIPUNCTURE: CPT

## 2022-03-17 PROCEDURE — 85025 COMPLETE CBC W/AUTO DIFF WBC: CPT

## 2022-03-17 PROCEDURE — 99195 PHLEBOTOMY: CPT

## 2022-03-17 NOTE — PROGRESS NOTES
[facility] Note:    Arrived - 1550  Assessment - completed, BP elevated    Patient denied having any symptoms of COVID-19, i.e. SOB, coughing, fever, or generally not feeling well. Also denies having been exposed to COVID-19 recently or having had any recent contact with family/friend that has a pending COVID test.     Pt informed nurse that he had attempted to perform phlebotomy on himself last week. He had bought the supplies online and tried to draw off blood by himself, but the tubing cap came off and blood started leaking out. Patient stopped due to the mess. RN informed patient that the procedure was very dangerous to do by himself at home, and if he had passed out the result could have been deadly. Pt says he won't try to do it on himself again, but would like to find a nurse to perform this in his home, in hopes that it would be more affordable. Informed patient that we don't encourage anyone to do therapeutic phlebotomy at home, and that labwork needs to be monitored. Recent Results (from the past 12 hour(s))   CBC WITH AUTOMATED DIFF    Collection Time: 03/17/22  3:55 PM   Result Value Ref Range    WBC 7.5 4.1 - 11.1 K/uL    RBC 4.34 4.10 - 5.70 M/uL    HGB 14.9 12.1 - 17.0 g/dL    HCT 42.7 36.6 - 50.3 %    MCV 98.4 80.0 - 99.0 FL    MCH 34.3 (H) 26.0 - 34.0 PG    MCHC 34.9 30.0 - 36.5 g/dL    RDW 12.3 11.5 - 14.5 %    PLATELET 134 010 - 503 K/uL    MPV 9.9 8.9 - 12.9 FL    NRBC 0.0 0  WBC    ABSOLUTE NRBC 0.00 0.00 - 0.01 K/uL    NEUTROPHILS 62 32 - 75 %    LYMPHOCYTES 25 12 - 49 %    MONOCYTES 8 5 - 13 %    EOSINOPHILS 4 0 - 7 %    BASOPHILS 1 0 - 1 %    IMMATURE GRANULOCYTES 0 0.0 - 0.5 %    ABS. NEUTROPHILS 4.7 1.8 - 8.0 K/UL    ABS. LYMPHOCYTES 1.9 0.8 - 3.5 K/UL    ABS. MONOCYTES 0.6 0.0 - 1.0 K/UL    ABS. EOSINOPHILS 0.3 0.0 - 0.4 K/UL    ABS. BASOPHILS 0.1 0.0 - 0.1 K/UL    ABS. IMM.  GRANS. 0.0 0.00 - 0.04 K/UL    DF AUTOMATED       Hgb 14.9    Therapeutic Phlebotomy -  # 16 Gauge butterfly needle used to access left AC. 1 unit (approx -500ml) prbc's removed into blood pack bag over 10 minutes. Needle removed. Hand-held pressure applied x 5 min. 2x2 applied to site and secured w/ coban. Tolerated well. Denies lightheadedness and dizziness. B/P remained stable. Phlebotomy site dressing- dry & intact. Patient Vitals for the past 12 hrs:   Temp Pulse Resp BP   03/17/22 1620 -- (!) 101 18 (!) 157/95   03/17/22 1550 98 °F (36.7 °C) 98 18 (!) 169/106       1620 Pt denies any acute problems/changes. Pt declined 30 minute monitoring period. Discharged from [facility]. No distress. Next appt: 3/24/22 @ 1600.

## 2022-03-18 PROBLEM — E83.110 HEREDITARY HEMOCHROMATOSIS (HCC): Status: ACTIVE | Noted: 2022-01-19

## 2022-03-18 PROBLEM — R79.89 ELEVATED LFTS: Status: ACTIVE | Noted: 2021-09-28

## 2022-03-21 ENCOUNTER — TELEPHONE (OUTPATIENT)
Dept: HEMATOLOGY | Age: 42
End: 2022-03-21

## 2022-03-21 DIAGNOSIS — E83.110 HEREDITARY HEMOCHROMATOSIS (HCC): Primary | ICD-10-CM

## 2022-03-21 NOTE — TELEPHONE ENCOUNTER
----- Message from [address]. Epifania Osgood, NP sent at 3/19/2022  7:25 PM EDT -----  Regarding: oncology blood letting  Jaqueline Devlin, I think you had said if we order it though oncology, the patient doesn't have a co-pay? Nica, please arrange this if it is true and call patient. He actually tried to self phlebotomize at home with stuff he bought off the internet. [email or web address] Referral sent over to  for phleb. (ASHLEY)    Werner@"[email or web address] Spoke w/[facility]  staff. Patient referral is in process. Patient will be called for appt next week. (ASHLEY)

## 2022-03-24 ENCOUNTER — HOSPITAL ENCOUNTER (OUTPATIENT)
Dept: INFUSION THERAPY | Age: 42
Discharge: HOME | End: 2022-03-24
Payer: COMMERCIAL

## 2022-03-24 VITALS
WEIGHT: 262.6 LBS | TEMPERATURE: 98.7 F | OXYGEN SATURATION: 97 % | RESPIRATION RATE: 18 BRPM | HEART RATE: 89 BPM | SYSTOLIC BLOOD PRESSURE: 141 MMHG | BODY MASS INDEX: 36.63 KG/M2 | DIASTOLIC BLOOD PRESSURE: 87 MMHG

## 2022-03-24 LAB
BASOPHILS # BLD: 0.1 K/UL (ref 0–0.1)
BASOPHILS NFR BLD: 1 % (ref 0–1)
DIFFERENTIAL METHOD BLD: ABNORMAL
EOSINOPHIL # BLD: 0.3 K/UL (ref 0–0.4)
EOSINOPHIL NFR BLD: 4 % (ref 0–7)
ERYTHROCYTE [DISTWIDTH] IN BLOOD BY AUTOMATED COUNT: 12.5 % (ref 11.5–14.5)
HCT VFR BLD AUTO: 38.6 % (ref 36.6–50.3)
HGB BLD-MCNC: 13.8 G/DL (ref 12.1–17)
IMM GRANULOCYTES # BLD AUTO: 0 K/UL (ref 0–0.04)
IMM GRANULOCYTES NFR BLD AUTO: 0 % (ref 0–0.5)
LYMPHOCYTES # BLD: 2.4 K/UL (ref 0.8–3.5)
LYMPHOCYTES NFR BLD: 31 % (ref 12–49)
MCH RBC QN AUTO: 34 PG (ref 26–34)
MCHC RBC AUTO-ENTMCNC: 35.8 G/DL (ref 30–36.5)
MCV RBC AUTO: 95.1 FL (ref 80–99)
MONOCYTES # BLD: 0.6 K/UL (ref 0–1)
MONOCYTES NFR BLD: 8 % (ref 5–13)
NEUTS SEG # BLD: 4.2 K/UL (ref 1.8–8)
NEUTS SEG NFR BLD: 56 % (ref 32–75)
NRBC # BLD: 0 K/UL (ref 0–0.01)
NRBC BLD-RTO: 0 PER 100 WBC
PLATELET # BLD AUTO: 200 K/UL (ref 150–400)
PMV BLD AUTO: 9.7 FL (ref 8.9–12.9)
RBC # BLD AUTO: 4.06 M/UL (ref 4.1–5.7)
WBC # BLD AUTO: 7.6 K/UL (ref 4.1–11.1)

## 2022-03-24 PROCEDURE — 36415 COLL VENOUS BLD VENIPUNCTURE: CPT

## 2022-03-24 PROCEDURE — 85025 COMPLETE CBC W/AUTO DIFF WBC: CPT

## 2022-03-24 PROCEDURE — 99195 PHLEBOTOMY: CPT

## 2022-03-24 NOTE — PROGRESS NOTES
[facility] Note:    Arrived - 1525  Assessment - completed, no new concerns voiced. Patient denied having any symptoms of COVID-19, i.e. SOB, coughing, fever, or generally not feeling well. Also denies having been exposed to COVID-19 recently or having had any recent contact with family/friend that has a pending COVID test.     CBC with diff drawn from right AC. Recent Results (from the past 12 hour(s))   CBC WITH AUTOMATED DIFF    Collection Time: 03/24/22  3:30 PM   Result Value Ref Range    WBC 7.6 4.1 - 11.1 K/uL    RBC 4.06 (L) 4.10 - 5.70 M/uL    HGB 13.8 12.1 - 17.0 g/dL    HCT 38.6 36.6 - 50.3 %    MCV 95.1 80.0 - 99.0 FL    MCH 34.0 26.0 - 34.0 PG    MCHC 35.8 30.0 - 36.5 g/dL    RDW 12.5 11.5 - 14.5 %    PLATELET 175 133 - 555 K/uL    MPV 9.7 8.9 - 12.9 FL    NRBC 0.0 0  WBC    ABSOLUTE NRBC 0.00 0.00 - 0.01 K/uL    NEUTROPHILS 56 32 - 75 %    LYMPHOCYTES 31 12 - 49 %    MONOCYTES 8 5 - 13 %    EOSINOPHILS 4 0 - 7 %    BASOPHILS 1 0 - 1 %    IMMATURE GRANULOCYTES 0 0.0 - 0.5 %    ABS. NEUTROPHILS 4.2 1.8 - 8.0 K/UL    ABS. LYMPHOCYTES 2.4 0.8 - 3.5 K/UL    ABS. MONOCYTES 0.6 0.0 - 1.0 K/UL    ABS. EOSINOPHILS 0.3 0.0 - 0.4 K/UL    ABS. BASOPHILS 0.1 0.0 - 0.1 K/UL    ABS. IMM. GRANS. 0.0 0.00 - 0.04 K/UL    DF AUTOMATED         Therapeutic Phlebotomy -  # 16 Gauge butterfly needle used to access left AC. 1 unit (approx -500ml) prbc's removed into blood pack bag over 10 minutes. Needle removed. Hand-held pressure applied x 5 min. 2x2 applied to site and secured w/ coban. Tolerated well. Denies lightheadedness and dizziness. B/P remained stable. Snacks provided. Pt declined to stay for 30 minute monitoring period. Phlebotomy site dressing- dry & intact. Patient Vitals for the past 12 hrs:   Temp Pulse Resp BP SpO2   03/24/22 1602 -- 89 18 (!) 141/87 --   03/24/22 1526 98.7 °F (37.1 °C) 89 18 (!) 136/90 97 %       1600 Pt denies any acute problems/changes. Discharged from [facility] ambulatory. No distress. Next appt: 3/31/22 @ 1600.

## 2022-03-31 ENCOUNTER — HOSPITAL ENCOUNTER (OUTPATIENT)
Dept: INFUSION THERAPY | Age: 42
Discharge: HOME | End: 2022-03-31
Payer: COMMERCIAL

## 2022-03-31 ENCOUNTER — TELEPHONE (OUTPATIENT)
Dept: HEMATOLOGY | Age: 42
End: 2022-03-31

## 2022-03-31 VITALS
HEART RATE: 94 BPM | WEIGHT: 264 LBS | SYSTOLIC BLOOD PRESSURE: 149 MMHG | OXYGEN SATURATION: 97 % | RESPIRATION RATE: 18 BRPM | BODY MASS INDEX: 36.82 KG/M2 | TEMPERATURE: 98 F | DIASTOLIC BLOOD PRESSURE: 77 MMHG

## 2022-03-31 LAB
BASOPHILS # BLD: 0.1 K/UL (ref 0–0.1)
BASOPHILS NFR BLD: 1 % (ref 0–1)
DIFFERENTIAL METHOD BLD: NORMAL
EOSINOPHIL # BLD: 0.2 K/UL (ref 0–0.4)
EOSINOPHIL NFR BLD: 3 % (ref 0–7)
ERYTHROCYTE [DISTWIDTH] IN BLOOD BY AUTOMATED COUNT: 12.5 % (ref 11.5–14.5)
HCT VFR BLD AUTO: 41.2 % (ref 36.6–50.3)
HGB BLD-MCNC: 14.3 G/DL (ref 12.1–17)
IMM GRANULOCYTES # BLD AUTO: 0 K/UL (ref 0–0.04)
IMM GRANULOCYTES NFR BLD AUTO: 0 % (ref 0–0.5)
LYMPHOCYTES # BLD: 2.2 K/UL (ref 0.8–3.5)
LYMPHOCYTES NFR BLD: 24 % (ref 12–49)
MCH RBC QN AUTO: 34 PG (ref 26–34)
MCHC RBC AUTO-ENTMCNC: 34.7 G/DL (ref 30–36.5)
MCV RBC AUTO: 98.1 FL (ref 80–99)
MONOCYTES # BLD: 0.7 K/UL (ref 0–1)
MONOCYTES NFR BLD: 8 % (ref 5–13)
NEUTS SEG # BLD: 5.9 K/UL (ref 1.8–8)
NEUTS SEG NFR BLD: 64 % (ref 32–75)
NRBC # BLD: 0 K/UL (ref 0–0.01)
NRBC BLD-RTO: 0 PER 100 WBC
PLATELET # BLD AUTO: 225 K/UL (ref 150–400)
PMV BLD AUTO: 10 FL (ref 8.9–12.9)
RBC # BLD AUTO: 4.2 M/UL (ref 4.1–5.7)
WBC # BLD AUTO: 9.2 K/UL (ref 4.1–11.1)

## 2022-03-31 PROCEDURE — 99195 PHLEBOTOMY: CPT

## 2022-03-31 PROCEDURE — 85025 COMPLETE CBC W/AUTO DIFF WBC: CPT

## 2022-03-31 NOTE — PROGRESS NOTES
[facility] Progress Note    Pt admit to [facility] for therapeutic phlebotomy in stable condition. Assessment completed. Patient denied having any symptoms of COVID-19, i.e. SOB, coughing, fever, or generally not feeling well. Also denies having been exposed to COVID-19 recently or having had any recent contact with family/friend that has a pending COVID test.     Lab drawn peripherally L AC w/o issue. Patient Vitals for the past 12 hrs:   Temp Pulse Resp BP SpO2   03/31/22 1535 -- 94 18 (!) 149/77 97 %   03/31/22 1449 98 °F (36.7 °C) 68 18 (!) 157/96 95 %        Recent Results (from the past 12 hour(s))   CBC WITH AUTOMATED DIFF    Collection Time: 03/31/22  2:55 PM   Result Value Ref Range    WBC 9.2 4.1 - 11.1 K/uL    RBC 4.20 4. 10 - 5.70 M/uL    HGB 14.3 12.1 - 17.0 g/dL    HCT 41.2 36.6 - 50.3 %    MCV 98.1 80.0 - 99.0 FL    MCH 34.0 26.0 - 34.0 PG    MCHC 34.7 30.0 - 36.5 g/dL    RDW 12.5 11.5 - 14.5 %    PLATELET 536 212 - 309 K/uL    MPV 10.0 8.9 - 12.9 FL    NRBC 0.0 0  WBC    ABSOLUTE NRBC 0.00 0.00 - 0.01 K/uL    NEUTROPHILS 64 32 - 75 %    LYMPHOCYTES 24 12 - 49 %    MONOCYTES 8 5 - 13 %    EOSINOPHILS 3 0 - 7 %    BASOPHILS 1 0 - 1 %    IMMATURE GRANULOCYTES 0 0.0 - 0.5 %    ABS. NEUTROPHILS 5.9 1.8 - 8.0 K/UL    ABS. LYMPHOCYTES 2.2 0.8 - 3.5 K/UL    ABS. MONOCYTES 0.7 0.0 - 1.0 K/UL    ABS. EOSINOPHILS 0.2 0.0 - 0.4 K/UL    ABS. BASOPHILS 0.1 0.0 - 0.1 K/UL    ABS. IMM. GRANS. 0.0 0.00 - 0.04 K/UL    DF AUTOMATED         Therapeutic phlebotomy performed with 16g needle L AC; appr 1/2 bag over 20min. 16g needle used for 2nd site R AC and another 1/2 bag phlebotomized over appr 8 min. Pt tolerated well. Pressure held until hemostasis obtained and both sites dressed with gauze and wrapped with Coban. Snack offered. Pt declined to stay for monitoring and discharged home in stable condition. Pt aware of next [facility] appointment scheduled for: No appt scheduled.  Written request given to Luz Marina, PSR at 1600.      Future Appointments   Date Time Provider Rah Mili   6/22/2022  3:05 PM VERONICA BoucherR BS AMB

## 2022-04-14 ENCOUNTER — HOSPITAL ENCOUNTER (OUTPATIENT)
Dept: INFUSION THERAPY | Age: 42
Discharge: HOME | End: 2022-04-14
Payer: COMMERCIAL

## 2022-04-14 VITALS
OXYGEN SATURATION: 98 % | WEIGHT: 260.7 LBS | TEMPERATURE: 98 F | DIASTOLIC BLOOD PRESSURE: 97 MMHG | SYSTOLIC BLOOD PRESSURE: 165 MMHG | HEART RATE: 110 BPM | BODY MASS INDEX: 36.36 KG/M2 | RESPIRATION RATE: 18 BRPM

## 2022-04-14 LAB — HGB BLD-MCNC: 11.2 G/DL (ref 12.1–17)

## 2022-04-14 PROCEDURE — 36416 COLLJ CAPILLARY BLOOD SPEC: CPT

## 2022-04-14 PROCEDURE — 99195 PHLEBOTOMY: CPT

## 2022-04-14 PROCEDURE — 85018 HEMOGLOBIN: CPT

## 2022-04-14 NOTE — PROGRESS NOTES
[facility] Note:   Arrived - 1325  Hemocue obtained    Patient denied having any symptoms of COVID-19, i.e. SOB, coughing, fever, or generally not feeling well. Also denies having been exposed to COVID-19 recently or having had any recent contact with family/friend that has a pending COVID test.    Patient Vitals for the past 12 hrs:   Temp Pulse Resp BP SpO2   04/14/22 1340 -- (!) 110 18 (!) 165/97 --   04/14/22 1325 98 °F (36.7 °C) 91 18 (!) 166/97 98 %     Recent Results (from the past 12 hour(s))   POC HEMOGLOBIN    Collection Time: 04/14/22  1:30 PM   Result Value Ref Range    Hemoglobin (POC) 11.2 (L) 12.1 - 17.0 g/dL       Assessment - unremarkable except elevated BP. Encouraged pt to follow up with PCP. Therapeutic Phlebotomy - # 16 Gauge butterfly needle used to access L AC. 1 unit (approx -500ml) prbc's removed into blood pack bag over 5 minutes. Needle removed. Hand-held pressure applied x 5 min. 2x2 applied to site and secured w/ coban. Tolerated well. Denies lightheadedness and dizziness. B/P remained stable. Snacks provided. Pt declined obeservation 30 minutes post phlebotomy - pt continues to deny dizziness & lightheadedness. BP re-checked after pt sitting up. Phlebotomy site dressing- dry & intact. Discharged ambulatory. Pt denies any acute problems/changes. Discharged from [facility] ambulatory. No distress.  Next appt: 4/21/22 at 1600

## 2022-04-21 ENCOUNTER — HOSPITAL ENCOUNTER (OUTPATIENT)
Dept: INFUSION THERAPY | Age: 42
End: 2022-04-21

## 2022-04-28 ENCOUNTER — HOSPITAL ENCOUNTER (OUTPATIENT)
Dept: INFUSION THERAPY | Age: 42
Discharge: HOME | End: 2022-04-28
Payer: COMMERCIAL

## 2022-04-28 VITALS
TEMPERATURE: 99 F | DIASTOLIC BLOOD PRESSURE: 98 MMHG | OXYGEN SATURATION: 98 % | BODY MASS INDEX: 36.4 KG/M2 | RESPIRATION RATE: 16 BRPM | WEIGHT: 261 LBS | SYSTOLIC BLOOD PRESSURE: 168 MMHG | HEART RATE: 78 BPM

## 2022-04-28 LAB
BASO+EOS+MONOS # BLD AUTO: 0.5 K/UL (ref 0.2–1.2)
BASO+EOS+MONOS NFR BLD AUTO: 5 % (ref 3.2–16.9)
BASOPHILS # BLD: 0.1 K/UL (ref 0–0.1)
BASOPHILS NFR BLD: 1 % (ref 0–1)
DIFFERENTIAL METHOD BLD: NORMAL
DIFFERENTIAL METHOD BLD: NORMAL
EOSINOPHIL # BLD: 0.4 K/UL (ref 0–0.4)
EOSINOPHIL NFR BLD: 4 % (ref 0–7)
ERYTHROCYTE [DISTWIDTH] IN BLOOD BY AUTOMATED COUNT: 12.6 % (ref 11.5–14.5)
ERYTHROCYTE [DISTWIDTH] IN BLOOD BY AUTOMATED COUNT: 13.3 % (ref 11.8–15.8)
HCT VFR BLD AUTO: 41.4 % (ref 36.6–50.3)
HCT VFR BLD AUTO: 42.1 % (ref 36.6–50.3)
HGB BLD-MCNC: 14.3 G/DL (ref 12.1–17)
HGB BLD-MCNC: 14.5 G/DL (ref 12.1–17)
IMM GRANULOCYTES # BLD AUTO: 0 K/UL (ref 0–0.04)
IMM GRANULOCYTES NFR BLD AUTO: 0 % (ref 0–0.5)
LYMPHOCYTES # BLD: 2.4 K/UL (ref 0.8–3.5)
LYMPHOCYTES # BLD: 2.6 K/UL (ref 0.8–3.5)
LYMPHOCYTES NFR BLD: 26 % (ref 12–49)
LYMPHOCYTES NFR BLD: 28 % (ref 12–49)
MCH RBC QN AUTO: 33.3 PG (ref 26–34)
MCH RBC QN AUTO: 33.6 PG (ref 26–34)
MCHC RBC AUTO-ENTMCNC: 34.4 G/DL (ref 30–36.5)
MCHC RBC AUTO-ENTMCNC: 34.5 G/DL (ref 30–36.5)
MCV RBC AUTO: 96.8 FL (ref 80–99)
MCV RBC AUTO: 97.2 FL (ref 80–99)
MONOCYTES # BLD: 0.8 K/UL (ref 0–1)
MONOCYTES NFR BLD: 8 % (ref 5–13)
NEUTS SEG # BLD: 5.7 K/UL (ref 1.8–8)
NEUTS SEG # BLD: 6.4 K/UL (ref 1.8–8)
NEUTS SEG NFR BLD: 61 % (ref 32–75)
NEUTS SEG NFR BLD: 67 % (ref 32–75)
NRBC # BLD: 0 K/UL (ref 0–0.01)
NRBC BLD-RTO: 0 PER 100 WBC
PLATELET # BLD AUTO: 262 K/UL (ref 150–400)
PLATELET # BLD AUTO: 264 K/UL (ref 150–400)
PMV BLD AUTO: 9.7 FL (ref 8.9–12.9)
RBC # BLD AUTO: 4.26 M/UL (ref 4.1–5.7)
RBC # BLD AUTO: 4.35 M/UL (ref 4.1–5.7)
WBC # BLD AUTO: 9.4 K/UL (ref 4.1–11.1)
WBC # BLD AUTO: 9.5 K/UL (ref 4.1–11.1)

## 2022-04-28 PROCEDURE — 85025 COMPLETE CBC W/AUTO DIFF WBC: CPT

## 2022-04-28 PROCEDURE — 99195 PHLEBOTOMY: CPT

## 2022-04-28 PROCEDURE — 36415 COLL VENOUS BLD VENIPUNCTURE: CPT

## 2022-04-28 NOTE — PROGRESS NOTES
[facility] NOTE    1550  Patient arrived ambulatory and in no distress for Therapeutic Phlebotomy. Assessment was completed, no acute issues at this time, no new complaints voiced. Labs drawn from right [facility] without difficulty. Recent Results (from the past 12 hour(s))   CBC WITH 3 PART DIFF    Collection Time: 04/28/22  3:59 PM   Result Value Ref Range    WBC 9.5 4.1 - 11.1 K/uL    RBC 4.35 4.10 - 5.70 M/uL    HGB 14.5 12.1 - 17.0 g/dL    HCT 42.1 36.6 - 50.3 %    MCV 96.8 80.0 - 99.0 FL    MCH 33.3 26.0 - 34.0 PG    MCHC 34.4 30.0 - 36.5 g/dL    RDW 13.3 11.8 - 15.8 %    PLATELET 461 632 - 834 K/uL    NEUTROPHILS 67 32 - 75 %    Mixed cells 5 3.2 - 16.9 %    LYMPHOCYTES 28 12 - 49 %    ABS. NEUTROPHILS 6.4 1.8 - 8.0 K/UL    ABS. MIXED CELLS 0.5 0.2 - 1.2 K/uL    ABS. LYMPHOCYTES 2.6 0.8 - 3.5 K/UL    DF AUTOMATED       1600:  Left AC vein accessed using 16 g therapeutic phlebotomy set. 500 ml whole blood removed without difficulty, pt tolerated well. Procedure completed at [id number], manual pressure applied until hemostasis noted; wrapped with coban. Nourishment provided. Patient tolerated treatment well and was discharged from [place] [address] in stable condition at 1610. Next appointment is 5/5/22 at 1600.

## 2022-05-05 ENCOUNTER — HOSPITAL ENCOUNTER (OUTPATIENT)
Dept: INFUSION THERAPY | Age: 42
Discharge: HOME | End: 2022-05-05
Payer: COMMERCIAL

## 2022-05-05 VITALS
SYSTOLIC BLOOD PRESSURE: 144 MMHG | OXYGEN SATURATION: 97 % | HEART RATE: 96 BPM | WEIGHT: 263.2 LBS | BODY MASS INDEX: 36.71 KG/M2 | DIASTOLIC BLOOD PRESSURE: 92 MMHG | TEMPERATURE: 98.3 F | RESPIRATION RATE: 16 BRPM

## 2022-05-05 LAB
BASOPHILS # BLD: 0.1 K/UL (ref 0–0.1)
BASOPHILS NFR BLD: 1 % (ref 0–1)
DIFFERENTIAL METHOD BLD: NORMAL
EOSINOPHIL # BLD: 0.3 K/UL (ref 0–0.4)
EOSINOPHIL NFR BLD: 4 % (ref 0–7)
ERYTHROCYTE [DISTWIDTH] IN BLOOD BY AUTOMATED COUNT: 12.5 % (ref 11.5–14.5)
HCT VFR BLD AUTO: 42.4 % (ref 36.6–50.3)
HGB BLD-MCNC: 14.5 G/DL (ref 12.1–17)
IMM GRANULOCYTES # BLD AUTO: 0 K/UL (ref 0–0.04)
IMM GRANULOCYTES NFR BLD AUTO: 0 % (ref 0–0.5)
LYMPHOCYTES # BLD: 2.3 K/UL (ref 0.8–3.5)
LYMPHOCYTES NFR BLD: 27 % (ref 12–49)
MCH RBC QN AUTO: 33.1 PG (ref 26–34)
MCHC RBC AUTO-ENTMCNC: 34.2 G/DL (ref 30–36.5)
MCV RBC AUTO: 96.8 FL (ref 80–99)
MONOCYTES # BLD: 0.8 K/UL (ref 0–1)
MONOCYTES NFR BLD: 10 % (ref 5–13)
NEUTS SEG # BLD: 4.9 K/UL (ref 1.8–8)
NEUTS SEG NFR BLD: 58 % (ref 32–75)
NRBC # BLD: 0 K/UL (ref 0–0.01)
NRBC BLD-RTO: 0 PER 100 WBC
PLATELET # BLD AUTO: 241 K/UL (ref 150–400)
PMV BLD AUTO: 10.1 FL (ref 8.9–12.9)
RBC # BLD AUTO: 4.38 M/UL (ref 4.1–5.7)
WBC # BLD AUTO: 8.5 K/UL (ref 4.1–11.1)

## 2022-05-05 PROCEDURE — 85025 COMPLETE CBC W/AUTO DIFF WBC: CPT

## 2022-05-05 PROCEDURE — 99195 PHLEBOTOMY: CPT

## 2022-05-05 NOTE — PROGRESS NOTES
[facility] Note:    Arrived - 1545  Assessment - unremarkable    Patient denied having any symptoms of COVID-19, i.e. SOB, coughing, fever, or generally not feeling well. Also denies having been exposed to COVID-19 recently or having had any recent contact with family/friend that has a pending COVID test.     Labs Obtained - CBC w/ diff  Recent Results (from the past 12 hour(s))   CBC WITH AUTOMATED DIFF    Collection Time: 05/05/22  3:49 PM   Result Value Ref Range    WBC 8.5 4.1 - 11.1 K/uL    RBC 4.38 4.10 - 5.70 M/uL    HGB 14.5 12.1 - 17.0 g/dL    HCT 42.4 36.6 - 50.3 %    MCV 96.8 80.0 - 99.0 FL    MCH 33.1 26.0 - 34.0 PG    MCHC 34.2 30.0 - 36.5 g/dL    RDW 12.5 11.5 - 14.5 %    PLATELET 857 391 - 380 K/uL    MPV 10.1 8.9 - 12.9 FL    NRBC 0.0 0  WBC    ABSOLUTE NRBC 0.00 0.00 - 0.01 K/uL    NEUTROPHILS 58 32 - 75 %    LYMPHOCYTES 27 12 - 49 %    MONOCYTES 10 5 - 13 %    EOSINOPHILS 4 0 - 7 %    BASOPHILS 1 0 - 1 %    IMMATURE GRANULOCYTES 0 0.0 - 0.5 %    ABS. NEUTROPHILS 4.9 1.8 - 8.0 K/UL    ABS. LYMPHOCYTES 2.3 0.8 - 3.5 K/UL    ABS. MONOCYTES 0.8 0.0 - 1.0 K/UL    ABS. EOSINOPHILS 0.3 0.0 - 0.4 K/UL    ABS. BASOPHILS 0.1 0.0 - 0.1 K/UL    ABS. IMM. GRANS. 0.0 0.00 - 0.04 K/UL    DF AUTOMATED       Therapeutic Phlebotomy -  # 16 Gauge butterfly needle used to access left AC. 1 unit (approx -500ml) prbc's removed into blood pack bag over 5 minutes. Needle removed. Hand-held pressure applied x 5 min. 2x2 applied to site and secured w/ coban. Tolerated well. Denies lightheadedness and dizziness. B/P remained stable. Snacks offered. Patient declined to stay for observation post phlebotomy - pt continues to deny dizziness & lightheadedness. BP re-checked after pt sitting up. Phlebotomy site dressing- dry & intact.      Patient Vitals for the past 12 hrs:   Temp Pulse Resp BP SpO2   05/05/22 1607 -- 96 16 (!) 144/92 --   05/05/22 1548 98.3 °F (36.8 °C) 90 16 (!) 142/89 97 %     1610- Discharged ambulatory. Pt denies any acute problems/changes. Discharged from [facility] ambulatory. No distress.  Next appt: 5/12/22

## 2022-05-19 ENCOUNTER — HOSPITAL ENCOUNTER (OUTPATIENT)
Dept: INFUSION THERAPY | Age: 42
End: 2022-05-19

## 2022-05-26 ENCOUNTER — HOSPITAL ENCOUNTER (OUTPATIENT)
Dept: INFUSION THERAPY | Age: 42
Discharge: HOME | End: 2022-05-26
Payer: COMMERCIAL

## 2022-05-26 VITALS
HEART RATE: 82 BPM | WEIGHT: 260.1 LBS | SYSTOLIC BLOOD PRESSURE: 155 MMHG | RESPIRATION RATE: 16 BRPM | TEMPERATURE: 98.3 F | OXYGEN SATURATION: 99 % | DIASTOLIC BLOOD PRESSURE: 90 MMHG | BODY MASS INDEX: 36.28 KG/M2

## 2022-05-26 LAB
BASOPHILS # BLD: 0.1 K/UL (ref 0–0.1)
BASOPHILS NFR BLD: 1 % (ref 0–1)
DIFFERENTIAL METHOD BLD: NORMAL
EOSINOPHIL # BLD: 0.3 K/UL (ref 0–0.4)
EOSINOPHIL NFR BLD: 3 % (ref 0–7)
ERYTHROCYTE [DISTWIDTH] IN BLOOD BY AUTOMATED COUNT: 12.2 % (ref 11.5–14.5)
HCT VFR BLD AUTO: 43.6 % (ref 36.6–50.3)
HGB BLD-MCNC: 15.2 G/DL (ref 12.1–17)
IMM GRANULOCYTES # BLD AUTO: 0 K/UL (ref 0–0.04)
IMM GRANULOCYTES NFR BLD AUTO: 0 % (ref 0–0.5)
LYMPHOCYTES # BLD: 2.1 K/UL (ref 0.8–3.5)
LYMPHOCYTES NFR BLD: 25 % (ref 12–49)
MCH RBC QN AUTO: 33 PG (ref 26–34)
MCHC RBC AUTO-ENTMCNC: 34.9 G/DL (ref 30–36.5)
MCV RBC AUTO: 94.8 FL (ref 80–99)
MONOCYTES # BLD: 0.7 K/UL (ref 0–1)
MONOCYTES NFR BLD: 8 % (ref 5–13)
NEUTS SEG # BLD: 5.3 K/UL (ref 1.8–8)
NEUTS SEG NFR BLD: 63 % (ref 32–75)
NRBC # BLD: 0 K/UL (ref 0–0.01)
NRBC BLD-RTO: 0 PER 100 WBC
PLATELET # BLD AUTO: 245 K/UL (ref 150–400)
PMV BLD AUTO: 10 FL (ref 8.9–12.9)
RBC # BLD AUTO: 4.6 M/UL (ref 4.1–5.7)
WBC # BLD AUTO: 8.4 K/UL (ref 4.1–11.1)

## 2022-05-26 PROCEDURE — 99195 PHLEBOTOMY: CPT

## 2022-05-26 PROCEDURE — 85025 COMPLETE CBC W/AUTO DIFF WBC: CPT

## 2022-05-26 NOTE — PROGRESS NOTES
[facility] Note:    Arrived - 1515  Assessment - unremarkable    Patient denied having any symptoms of COVID-19, i.e. SOB, coughing, fever, or generally not feeling well. Also denies having been exposed to COVID-19 recently or having had any recent contact with family/friend that has a pending COVID test.     Labs Obtained - CBC w/ diff  Recent Results (from the past 12 hour(s))   CBC WITH AUTOMATED DIFF    Collection Time: 05/26/22  3:21 PM   Result Value Ref Range    WBC 8.4 4.1 - 11.1 K/uL    RBC 4.60 4.10 - 5.70 M/uL    HGB 15.2 12.1 - 17.0 g/dL    HCT 43.6 36.6 - 50.3 %    MCV 94.8 80.0 - 99.0 FL    MCH 33.0 26.0 - 34.0 PG    MCHC 34.9 30.0 - 36.5 g/dL    RDW 12.2 11.5 - 14.5 %    PLATELET 990 771 - 191 K/uL    MPV 10.0 8.9 - 12.9 FL    NRBC 0.0 0  WBC    ABSOLUTE NRBC 0.00 0.00 - 0.01 K/uL    NEUTROPHILS 63 32 - 75 %    LYMPHOCYTES 25 12 - 49 %    MONOCYTES 8 5 - 13 %    EOSINOPHILS 3 0 - 7 %    BASOPHILS 1 0 - 1 %    IMMATURE GRANULOCYTES 0 0.0 - 0.5 %    ABS. NEUTROPHILS 5.3 1.8 - 8.0 K/UL    ABS. LYMPHOCYTES 2.1 0.8 - 3.5 K/UL    ABS. MONOCYTES 0.7 0.0 - 1.0 K/UL    ABS. EOSINOPHILS 0.3 0.0 - 0.4 K/UL    ABS. BASOPHILS 0.1 0.0 - 0.1 K/UL    ABS. IMM. GRANS. 0.0 0.00 - 0.04 K/UL    DF AUTOMATED       Therapeutic Phlebotomy -  # 16 Gauge butterfly needle used to access left AC. 1 unit (approx -500ml) prbc's removed into blood pack bag over 7 minutes. Needle removed. Hand-held pressure applied x 5 min. 2x2 applied to site and secured w/ coban. Tolerated well. Denies lightheadedness and dizziness. B/P remained stable. Patient declined to stay for observation post phlebotomy - pt continues to deny dizziness & lightheadedness. BP re-checked after pt sitting up. Phlebotomy site dressing- dry & intact.      Patient Vitals for the past 12 hrs:   Temp Pulse Resp BP SpO2   05/26/22 1543 -- 82 16 (!) 155/90 --   05/26/22 1516 98.3 °F (36.8 °C) 92 16 (!) 154/92 99 %     1545- Discharged ambulatory. Pt denies any acute problems/changes. Discharged from [facility] ambulatory. No distress.  Next appt: 6/2/22

## 2022-06-02 ENCOUNTER — HOSPITAL ENCOUNTER (OUTPATIENT)
Dept: INFUSION THERAPY | Age: 42
End: 2022-06-02

## 2022-06-09 ENCOUNTER — APPOINTMENT (OUTPATIENT)
Dept: INFUSION THERAPY | Age: 42
End: 2022-06-09

## 2022-09-06 ENCOUNTER — OFFICE VISIT (OUTPATIENT)
Dept: HEMATOLOGY | Age: 42
End: 2022-09-06
Payer: COMMERCIAL

## 2022-09-06 VITALS
DIASTOLIC BLOOD PRESSURE: 94 MMHG | HEART RATE: 94 BPM | BODY MASS INDEX: 36.68 KG/M2 | WEIGHT: 262 LBS | SYSTOLIC BLOOD PRESSURE: 139 MMHG | HEIGHT: 71 IN | TEMPERATURE: 97.6 F | OXYGEN SATURATION: 98 %

## 2022-09-06 DIAGNOSIS — E83.110 HEREDITARY HEMOCHROMATOSIS (HCC): Primary | ICD-10-CM

## 2022-09-06 PROCEDURE — 91200 LIVER ELASTOGRAPHY: CPT | Performed by: PHYSICIAN ASSISTANT

## 2022-09-06 PROCEDURE — 99214 OFFICE O/P EST MOD 30 MIN: CPT | Performed by: PHYSICIAN ASSISTANT

## 2022-09-06 NOTE — PROGRESS NOTES
[address] [facility], [place]t AUGUSTIN, [facility], [place]      PACHECO Mcclellan, [facility]     Poornima Berman, [facility] Hospital[facility]BC   Sterling Prosper, P-C    Leland Gage [facility] Hospital[facility]-BC       Gaston Connolly [facility] 136    at [address], [facility] Nila Bearden  22.    [phone]    FAX: [address] [place] [address], [address]    [phone]    FAX: [phone]     Patient Care Team:  Yolie Gasca as PCP - General (Physician Assistant)  Yolie Gasca as PCP - [facility] Denise Provider  [facility], Not On File, [facility]    Patient Active Problem List   Diagnosis Code    Olecranon bursitis of right elbow M70.21    Hidradenitis L73.2    Elevated LFTs R79.89    Hereditary hemochromatosis [facility]) E83.110       Cherelle Vital is being seen at [facility] for management of hereditary hemochromatosis. The active problem list, all pertinent past medical history, medications, radiologic findings and laboratory findings related to the liver disorder were reviewed and discussed with the patient. The patient is a 38 y.o.  male who was found to have elevated liver enzymes in 8/2021. The patient has no symptoms which can be attributed to the liver disorder. 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 Elisha olerated phlebotomy well but his work schedule had made consistency difficult and he went for phlebotomy every 2-3 weeks until the end of 5/2022 (16 units in total). At that time, his insurance changed and he had a $4000 OOP deductible and couldn't proceed with phlebotomy at the [facility].   We had made referral to [facility] as phlebotomy in an office visit can be more affordable, but patient was not able to maintain this follow-up. He has had no labs or phlebotomy in the past 3 months. He presents for baseline Fibroscan today as well. The patient has not experienced the following symptoms: pain in the right side over the liver, yellowing of the eyes or skin, itching or swelling of the abdomen. The patient completes all daily activities without any functional limitations. ASSESSMENT AND PLAN:  Genetic hemochromatosis  The patient is homozygous C282Y. The patient has genetic hemochromatosis and either has or is at risk to develop FE overload in the liver and other organs. The ferritin will need to be repeated today and I have ordered AFP as well. The risk of cirrhosis is high and his current Fibroscan suggests at least bridging fibrosis. There is also a strong suggestion of steatosis. I have advised that we will need to obtain current labs to assess impact of past phlebotomy and what interval for additional (if any) phlebotomy is indicated. If needed, we will make another referral to [facility] for phlebotomy as infusion centers have been costly to the patient. I have also asked him to focus on a targeted weight loss of ~25# (or 10%) in the next 6 months. We will plan on repeat Fibroscan in 6 months and if this continues to be elevated, we have discussed confirming fibrosis with a liver biopsy. He is in agreement with this plan. Patient would be at increased risk for [address] in the setting of advanced fibrosis and we will determine need for regular ultrasound on the basis of AFP, and further Fibroscan/biopsy. Once therapeutic phlebotomy therapy has been completed and the serum ferritin is down around 50, the patient can be a healthy blood donor if the liver enzyme    Counseling for diet and weight loss in patients with confirmed or suspected NAFLD  The patient was counseled regarding diet and exercise to achieve weight loss.  The best diet for patients with fatty liver is one very low in carbohydrates and enriched with protein. As above, I have asked him to focus on a 25# weight loss. The patient was told not to consume any food products and drinks containing fructose as this enhances hepatic fat synthesis. There is no medication or vitamin supplements we advocate for ARAYA. Using glitazones in patients without diabetes mellitus has been shown to reduce fat content in the liver but has no effect on fibrosis and is associated with weight gain. Vitamin E has also been used but the data is not very good and most experts no longer advocate this. Screening for hepatocellular carcinoma  HCC screening is not necessary if the patient has no evidence of cirrhosis. This may need to be instituted regularly. Patient has indicated that he has had difficulty with copay cost of this study and would like to schedule after the new year if indicated. Treatment of other medical problems in patients with chronic liver disease  There are no contraindications for the patient to take most medications necessary for treatment of other medical issues. The patient can take any medications utilized for treatment of DM and/or statins to treat hypercholesterolemia. The patient does not consume alcohol on a daily basis. Normal doses of acetaminophen, as recommended on the label of the bottle, are not hepatotoxic except in the setting of daily alcohol use, even in patients with cirrhosis and can be utilized for pain. Counseling for alcohol in patients with chronic liver disease  The patient was counseled regarding alcohol consumption and the effect of alcohol on chronic liver disease. The patient does not consume any significant amount of alcohol. Vaccinations   Recommend vaccination against viral hepatitis B. He has documented immunity to viral hepatitis A. Routine vaccinations against other bacterial and viral agents can be performed as indicated. Annual flu vaccination should be administered if indicated. No Known Allergies  No current outpatient medications on file prior to visit. No current facility-administered medications on file prior to visit. FAMILY HISTORY:  His parents both  when he was young and he is unaware of any genetic or liver problems. SOCIAL HISTORY:  The patient has a long term girlfriend. The patient has no children. The patient smokes 1 1/2 - 2 PPD. The patient drinks 0-2 beers/day. The patient currently works full time as a [employer]. PHYSICAL EXAMINATION:  Visit Vitals  BP (!) 139/94 (BP 1 Location: Right arm, BP Patient Position: Sitting, BP Cuff Size: Adult long)   Pulse 94   Temp 97.6 °F (36.4 °C) (Temporal)   Ht 5' 11\" (1.803 m)   Wt 262 lb (118.8 kg)   SpO2 98%   BMI 36.54 kg/m²     General: No acute distress. Eyes: Sclera anicteric. ENT: No oral lesions. Nodes: No adenopathy. Skin: No spider angiomata. No jaundice. No palmar erythema. Respiratory: No wheezing, respiratory distress, cyanosis. Cardiovascular: No JVD. Abdomen: Obese, soft/nontender, no palpable liver edge and no obvious ascites. Extremities: No edema. No muscle wasting. No gross arthritic changes. Neurologic: Alert and oriented. Cranial nerves grossly intact. No asterixis.     LABORATORY STUDIES:  Liver [place] of [address] Units 2022   WBC 4.1 - 11.1 K/uL 7.4   ANC 1.8 - 8.0 K/UL 4.9   HGB 12.1 - 17.0 g/dL 12.9   HGB 12.1 - 17.0 g/dL    HGB (iSTAT) 12.1 - 17.0 g/dL     - 400 K/uL 217   INR 0.9 - 1.2    AST 15 - 37 U/L 43 (H)   ALT 12 - 78 U/L 61   Alk Phos 45 - 117 U/L 99   Bili, Total 0.2 - 1.0 MG/DL 0.5   Bili, Direct 0.0 - 0.2 MG/DL 0.1   Albumin 3.5 - 5.0 g/dL 3.6   BUN 6 - 20 MG/DL 10   Creat 0.70 - 1.30 MG/DL 0.84   Na 136 - 145 mmol/L 137   K 3.5 - 5.1 mmol/L 4.1   Cl 97 - 108 mmol/L 106   CO2 21 - 32 mmol/L 27   Glucose 65 - 100 mg/dL 133 (H)     Liver [address] Ref Rng & Units 9/28/2021   WBC 3.4 - 10.8 x10E3/uL 8.1   HGB 13.0 - 17.7 g/dL 15.1    - 450 x10E3/uL 228   INR 0.9 - 1.2 1.0   AST 0 - 40 IU/L 46 (H)   ALT 0 - 44 IU/L 55 (H)   Alk Phos 44 - 121 IU/L 101   Bili, Total 0.0 - 1.2 mg/dL 0.6   Bili, Direct 0.00 - 0.40 mg/dL 0.17   Albumin 4.0 - 5.0 g/dL 4.6   BUN 6 - 20 mg/dL 10   Creat 0.76 - 1.27 mg/dL 0.89   Na 134 - 144 mmol/L 141   K 3.5 - 5.2 mmol/L 4.1   Cl 96 - 106 mmol/L 102   CO2 20 - 29 mmol/L 23   Glucose 65 - 99 mg/dL 160 (H)   Additional lab values drawn at today's office visit are pending at the time of documentation. SEROLOGIES:  Serologies Latest Ref Rng & Units 1/27/2022 9/28/2021   Hep A Ab, Total Negative  Positive (A)   Hep B Surface Ag Negative  Negative   Hep B Core Ab, Total Negative  Negative   Hep B Surface AB QL   Non Reactive   Ferritin 26 - 388 NG/ (H) 1,870 (H)   Iron % Saturation 20 - 50 % 50 >94 (HH)   C-ANCA Neg:<1:20 titer  <1:20   P-ANCA Neg:<1:20 titer  <1:20   ANCA Neg:<1:20 titer  <1:20   ASMCA 0 - 19 Units  4   M2 Ab 0.0 - 20.0 Units  <20.0   Ceruloplasmin 16.0 - 31.0 mg/dL  16.3     LIVER HISTOLOGY:  9/2022. FibroScan performed at [address] EkPa was 11.3. Suggested fibrosis level is F3. CAP score is 323, this is consistent with steatosis. ENDOSCOPIC PROCEDURES:  Not available or performed    RADIOLOGY:  10/7/2021. Abdominal ultrasound. Mild to moderate hepatic steatosis. OTHER TESTING:  Not available or performed    FOLLOW-UP:  All of the issues listed above in the assessment and plan were discussed with the patient. All questions were answered. The patient expressed a clear understanding of the above. [facility] 87 in 6 months with repeat FibroScan. I will contact patient with recommendations regarding additional phlebotomy after review of pending labs.      Documentation reviewed and updated to reflect current, accurate patient information.     Jarrod Anaya PA-C  [facility] 59, [address] [facility] 22.  [phone]  [address] [facility]

## 2022-09-06 NOTE — PROGRESS NOTES
Identified pt with two pt identifiers(name and ). Reviewed record in preparation for visit and have obtained necessary documentation. Chief Complaint   Patient presents with    Elevated Liver Enzymes     FS with Edyta      Vitals:    22 1144   BP: (!) 139/94   Pulse: 94   Temp: 97.6 °F (36.4 °C)   TempSrc: Temporal   SpO2: 98%   Weight: 262 lb (118.8 kg)   Height: 5' 11\" (1.803 m)   PainSc:   0 - No pain       Health Maintenance Review: Patient reminded of \"due or due soon\" health maintenance. I have asked the patient to contact his/her primary care provider (PCP) for follow-up on his/her health maintenance. Coordination of Care Questionnaire:  :   1) Have you been to an emergency room, urgent care, or hospitalized since your last visit? If yes, where when, and reason for visit? no       2. Have seen or consulted any other health care provider since your last visit? If yes, where when, and reason for visit? NO      Patient is accompanied by self I have received verbal consent from Ace Bennett to discuss any/all medical information while they are present in the room.

## 2022-09-07 LAB
AFP L3 MFR SERPL: NORMAL % (ref 0–9.9)
AFP SERPL-MCNC: 4.4 NG/ML (ref 0–6.9)
ALBUMIN SERPL-MCNC: 4.8 G/DL (ref 4–5)
ALP SERPL-CCNC: 116 IU/L (ref 44–121)
ALT SERPL-CCNC: 54 IU/L (ref 0–44)
AST SERPL-CCNC: 60 IU/L (ref 0–40)
BILIRUB DIRECT SERPL-MCNC: 0.18 MG/DL (ref 0–0.4)
BILIRUB SERPL-MCNC: 0.6 MG/DL (ref 0–1.2)
BUN SERPL-MCNC: 10 MG/DL (ref 6–24)
BUN/CREAT SERPL: 13 (ref 9–20)
CALCIUM SERPL-MCNC: 9.9 MG/DL (ref 8.7–10.2)
CHLORIDE SERPL-SCNC: 99 MMOL/L (ref 96–106)
CO2 SERPL-SCNC: 24 MMOL/L (ref 20–29)
CREAT SERPL-MCNC: 0.76 MG/DL (ref 0.76–1.27)
EGFR: 117 ML/MIN/1.73
ERYTHROCYTE [DISTWIDTH] IN BLOOD BY AUTOMATED COUNT: 13.3 % (ref 11.6–15.4)
FERRITIN SERPL-MCNC: 157 NG/ML (ref 30–400)
GLUCOSE SERPL-MCNC: 117 MG/DL (ref 65–99)
HCT VFR BLD AUTO: 46.3 % (ref 37.5–51)
HGB BLD-MCNC: 16.1 G/DL (ref 13–17.7)
IRON SATN MFR SERPL: 36 % (ref 15–55)
IRON SERPL-MCNC: 128 UG/DL (ref 38–169)
MCH RBC QN AUTO: 31.3 PG (ref 26.6–33)
MCHC RBC AUTO-ENTMCNC: 34.8 G/DL (ref 31.5–35.7)
MCV RBC AUTO: 90 FL (ref 79–97)
PLATELET # BLD AUTO: 242 X10E3/UL (ref 150–450)
POTASSIUM SERPL-SCNC: 4.6 MMOL/L (ref 3.5–5.2)
PROT SERPL-MCNC: 7.3 G/DL (ref 6–8.5)
RBC # BLD AUTO: 5.15 X10E6/UL (ref 4.14–5.8)
SODIUM SERPL-SCNC: 139 MMOL/L (ref 134–144)
TIBC SERPL-MCNC: 355 UG/DL (ref 250–450)
UIBC SERPL-MCNC: 227 UG/DL (ref 111–343)
WBC # BLD AUTO: 9.3 X10E3/UL (ref 3.4–10.8)

## 2022-09-08 NOTE — PROGRESS NOTES
Pt notified of stable findings, iron indices do not support need for additional phleb at this time. Will recheck in 6 mo with repeat FS. I have asked him to focus on wt loss measures as fatty liver on FS is likely associated with continued mild elevation in liver enzymes.

## 2023-01-04 ENCOUNTER — APPOINTMENT (OUTPATIENT)
Dept: CT IMAGING | Age: 43
End: 2023-01-04
Attending: EMERGENCY MEDICINE
Payer: COMMERCIAL

## 2023-01-04 ENCOUNTER — HOSPITAL ENCOUNTER (EMERGENCY)
Age: 43
Discharge: HOME | End: 2023-01-04
Attending: EMERGENCY MEDICINE
Payer: COMMERCIAL

## 2023-01-04 VITALS
DIASTOLIC BLOOD PRESSURE: 95 MMHG | BODY MASS INDEX: 35.25 KG/M2 | WEIGHT: 251.77 LBS | TEMPERATURE: 97.7 F | SYSTOLIC BLOOD PRESSURE: 153 MMHG | OXYGEN SATURATION: 97 % | RESPIRATION RATE: 20 BRPM | HEART RATE: 84 BPM | HEIGHT: 71 IN

## 2023-01-04 DIAGNOSIS — K85.90 ACUTE PANCREATITIS, UNSPECIFIED COMPLICATION STATUS, UNSPECIFIED PANCREATITIS TYPE: Primary | ICD-10-CM

## 2023-01-04 LAB
ALBUMIN SERPL-MCNC: 4.1 G/DL (ref 3.5–5)
ALBUMIN/GLOB SERPL: 1.1 {RATIO} (ref 1.1–2.2)
ALP SERPL-CCNC: 109 U/L (ref 45–117)
ALT SERPL-CCNC: 66 U/L (ref 12–78)
ANION GAP SERPL CALC-SCNC: 12 MMOL/L (ref 5–15)
APPEARANCE UR: CLEAR
AST SERPL-CCNC: 38 U/L (ref 15–37)
BACTERIA URNS QL MICRO: NEGATIVE /HPF
BASOPHILS # BLD: 0.1 K/UL (ref 0–0.1)
BASOPHILS NFR BLD: 1 % (ref 0–1)
BILIRUB SERPL-MCNC: 0.9 MG/DL (ref 0.2–1)
BILIRUB UR QL: NEGATIVE
BUN SERPL-MCNC: 6 MG/DL (ref 6–20)
BUN/CREAT SERPL: 6 (ref 12–20)
CALCIUM SERPL-MCNC: 9 MG/DL (ref 8.5–10.1)
CHLORIDE SERPL-SCNC: 98 MMOL/L (ref 97–108)
CO2 SERPL-SCNC: 27 MMOL/L (ref 21–32)
COLOR UR: ABNORMAL
CREAT SERPL-MCNC: 1 MG/DL (ref 0.7–1.3)
DIFFERENTIAL METHOD BLD: ABNORMAL
EOSINOPHIL # BLD: 0.1 K/UL (ref 0–0.4)
EOSINOPHIL NFR BLD: 1 % (ref 0–7)
EPITH CASTS URNS QL MICRO: NORMAL /LPF
ERYTHROCYTE [DISTWIDTH] IN BLOOD BY AUTOMATED COUNT: 12.6 % (ref 11.5–14.5)
GLOBULIN SER CALC-MCNC: 3.9 G/DL (ref 2–4)
GLUCOSE SERPL-MCNC: 176 MG/DL (ref 65–100)
GLUCOSE UR STRIP.AUTO-MCNC: NEGATIVE MG/DL
HCT VFR BLD AUTO: 47.1 % (ref 36.6–50.3)
HGB BLD-MCNC: 16.4 G/DL (ref 12.1–17)
HGB UR QL STRIP: NEGATIVE
IMM GRANULOCYTES # BLD AUTO: 0 K/UL (ref 0–0.04)
IMM GRANULOCYTES NFR BLD AUTO: 0 % (ref 0–0.5)
KETONES UR QL STRIP.AUTO: NEGATIVE MG/DL
LEUKOCYTE ESTERASE UR QL STRIP.AUTO: NEGATIVE
LIPASE SERPL-CCNC: >3000 U/L (ref 73–393)
LYMPHOCYTES # BLD: 1.6 K/UL (ref 0.8–3.5)
LYMPHOCYTES NFR BLD: 14 % (ref 12–49)
MCH RBC QN AUTO: 31.5 PG (ref 26–34)
MCHC RBC AUTO-ENTMCNC: 34.8 G/DL (ref 30–36.5)
MCV RBC AUTO: 90.4 FL (ref 80–99)
MONOCYTES # BLD: 0.8 K/UL (ref 0–1)
MONOCYTES NFR BLD: 7 % (ref 5–13)
NEUTS SEG # BLD: 8.4 K/UL (ref 1.8–8)
NEUTS SEG NFR BLD: 77 % (ref 32–75)
NITRITE UR QL STRIP.AUTO: NEGATIVE
NRBC # BLD: 0 K/UL (ref 0–0.01)
NRBC BLD-RTO: 0 PER 100 WBC
PH UR STRIP: 8 [PH] (ref 5–8)
PLATELET # BLD AUTO: 216 K/UL (ref 150–400)
PMV BLD AUTO: 9.6 FL (ref 8.9–12.9)
POTASSIUM SERPL-SCNC: 3.7 MMOL/L (ref 3.5–5.1)
PROT SERPL-MCNC: 8 G/DL (ref 6.4–8.2)
PROT UR STRIP-MCNC: 100 MG/DL
RBC # BLD AUTO: 5.21 M/UL (ref 4.1–5.7)
RBC #/AREA URNS HPF: NORMAL /HPF (ref 0–5)
SODIUM SERPL-SCNC: 137 MMOL/L (ref 136–145)
SP GR UR REFRACTOMETRY: 1.02 (ref 1–1.03)
UROBILINOGEN UR QL STRIP.AUTO: 0.2 EU/DL (ref 0.2–1)
WBC # BLD AUTO: 10.9 K/UL (ref 4.1–11.1)
WBC URNS QL MICRO: NORMAL /HPF (ref 0–4)

## 2023-01-04 PROCEDURE — 36415 COLL VENOUS BLD VENIPUNCTURE: CPT

## 2023-01-04 PROCEDURE — 96376 TX/PRO/DX INJ SAME DRUG ADON: CPT

## 2023-01-04 PROCEDURE — 80053 COMPREHEN METABOLIC PANEL: CPT

## 2023-01-04 PROCEDURE — 81001 URINALYSIS AUTO W/SCOPE: CPT

## 2023-01-04 PROCEDURE — 85025 COMPLETE CBC W/AUTO DIFF WBC: CPT

## 2023-01-04 PROCEDURE — 74011000636 HC RX REV CODE- 636: Performed by: EMERGENCY MEDICINE

## 2023-01-04 PROCEDURE — 96374 THER/PROPH/DIAG INJ IV PUSH: CPT

## 2023-01-04 PROCEDURE — 74177 CT ABD & PELVIS W/CONTRAST: CPT

## 2023-01-04 PROCEDURE — 74011250636 HC RX REV CODE- 250/636: Performed by: EMERGENCY MEDICINE

## 2023-01-04 PROCEDURE — 83690 ASSAY OF LIPASE: CPT

## 2023-01-04 PROCEDURE — 99285 EMERGENCY DEPT VISIT HI MDM: CPT

## 2023-01-04 PROCEDURE — 96375 TX/PRO/DX INJ NEW DRUG ADDON: CPT

## 2023-01-04 RX ORDER — OXYCODONE AND ACETAMINOPHEN 5; 325 MG/1; MG/1
1 TABLET ORAL
Qty: 15 TABLET | Refills: 0 | Status: SHIPPED | OUTPATIENT
Start: 2023-01-04 | End: 2023-01-07

## 2023-01-04 RX ORDER — KETOROLAC TROMETHAMINE 30 MG/ML
30 INJECTION, SOLUTION INTRAMUSCULAR; INTRAVENOUS
Status: COMPLETED | OUTPATIENT
Start: 2023-01-04 | End: 2023-01-04

## 2023-01-04 RX ORDER — TRIAMCINOLONE ACETONIDE 1 MG/G
CREAM TOPICAL
COMMUNITY

## 2023-01-04 RX ORDER — HYDROMORPHONE HYDROCHLORIDE 1 MG/ML
1 INJECTION, SOLUTION INTRAMUSCULAR; INTRAVENOUS; SUBCUTANEOUS ONCE
Status: COMPLETED | OUTPATIENT
Start: 2023-01-04 | End: 2023-01-04

## 2023-01-04 RX ORDER — MORPHINE SULFATE 4 MG/ML
4 INJECTION INTRAVENOUS ONCE
Status: DISCONTINUED | OUTPATIENT
Start: 2023-01-04 | End: 2023-01-04 | Stop reason: HOSPADM

## 2023-01-04 RX ORDER — ONDANSETRON 4 MG/1
4 TABLET, ORALLY DISINTEGRATING ORAL
Qty: 12 TABLET | Refills: 0 | Status: SHIPPED | OUTPATIENT
Start: 2023-01-04

## 2023-01-04 RX ORDER — ONDANSETRON 2 MG/ML
4 INJECTION INTRAMUSCULAR; INTRAVENOUS ONCE
Status: COMPLETED | OUTPATIENT
Start: 2023-01-04 | End: 2023-01-04

## 2023-01-04 RX ADMIN — KETOROLAC TROMETHAMINE 30 MG: 30 INJECTION, SOLUTION INTRAMUSCULAR; INTRAVENOUS at 12:52

## 2023-01-04 RX ADMIN — IOPAMIDOL 100 ML: 755 INJECTION, SOLUTION INTRAVENOUS at 13:05

## 2023-01-04 RX ADMIN — HYDROMORPHONE HYDROCHLORIDE 1 MG: 1 INJECTION, SOLUTION INTRAMUSCULAR; INTRAVENOUS; SUBCUTANEOUS at 14:45

## 2023-01-04 RX ADMIN — ONDANSETRON 4 MG: 2 INJECTION INTRAMUSCULAR; INTRAVENOUS at 12:24

## 2023-01-04 RX ADMIN — HYDROMORPHONE HYDROCHLORIDE 1 MG: 1 INJECTION, SOLUTION INTRAMUSCULAR; INTRAVENOUS; SUBCUTANEOUS at 13:48

## 2023-01-04 NOTE — Clinical Note
[address] EMERGENCY DEPT  [address] [facility] [address]  [phone]    Work/School Note    Date: 1/4/2023    To Whom It May concern:    Reina Wu was seen and treated today in the emergency room by the following provider(s):  Attending Provider: Merline Petite is excused from work/school on 1/4/2023 through 1/6/2023. He is medically clear to return to work/school on 1/7/2023.          Sincerely,          Ashok Jean, DO

## 2023-01-04 NOTE — ED PROVIDER NOTES
Date of Service:  1/4/2023    Patient:  Poncho Barone    Chief Complaint:  Abdominal Pain       HPI:  Poncho Barone is a 38 y.o.  male who presents for evaluation of abdominal pain. Patient states that he woke up this morning with some abdominal pain. Describes generalized allover abdominal pain but mostly in the right upper quadrant and midepigastric region. No history of the like. Pain was manageable but now has become more intense and constant. He has nausea without vomiting. No change in bowel or bladder habits. Specifically no dysuria or flank pain. History of ruptured spleen but no surgery from that. As far as he knows he is never had any abdominal surgeries. He otherwise denies other acute complaints. Patient does not drink. He states that he does smoke. History per patient       History reviewed. No pertinent past medical history. History reviewed. No pertinent surgical history.       Family History:   Problem Relation Age of Onset    Heart Disease Father     Headache Father        Social History     Socioeconomic History    Marital status: SINGLE     Spouse name: Not on file    Number of children: Not on file    Years of education: Not on file    Highest education level: Not on file   Occupational History    Not on file   Tobacco Use    Smoking status: Every Day     Packs/day: 1.50     Types: Cigarettes    Smokeless tobacco: Never   Vaping Use    Vaping Use: Never used   Substance and Sexual Activity    Alcohol use: Not Currently     Alcohol/week: 3.0 standard drinks     Types: 3 Cans of beer per week     Comment: 3 weekly    Drug use: No    Sexual activity: Not on file   Other Topics Concern     Service Not Asked    Blood Transfusions Not Asked    Caffeine Concern Not Asked    Occupational Exposure Not Asked    Hobby Hazards Not Asked    Sleep Concern Not Asked    Stress Concern Not Asked    Weight Concern Not Asked    Special Diet Not Asked    Back Care Not Asked    Exercise Not Asked    Bike Helmet Not Asked    Seat Belt Not Asked    Self-Exams Not Asked   Social History Narrative    ** Merged History Encounter **          Social Determinants of Health     Financial Resource Strain: Not on file   Food Insecurity: Not on file   Transportation Needs: Not on file   Physical Activity: Not on file   Stress: Not on file   Social Connections: Not on file   Intimate Partner Violence: Not on file   Housing Stability: Not on file         ALLERGIES: Patient has no known allergies. Review of Systems   All other systems reviewed and are negative. Vitals:    01/04/23 1135 01/04/23 1142   BP: (!) 181/122    Pulse: 84    Resp: 20    Temp: 97.7 °F (36.5 °C)    SpO2: 98% 98%   Weight: 114.2 kg (251 lb 12.3 oz)    Height: 5' 11\" (1.803 m)             Physical Exam  Vitals and nursing note reviewed. Constitutional:       General: He is in acute distress. Appearance: He is well-developed. He is not ill-appearing, toxic-appearing or diaphoretic. HENT:      Head: Normocephalic and atraumatic. Cardiovascular:      Rate and Rhythm: Normal rate. Pulmonary:      Effort: Pulmonary effort is normal.   Abdominal:      General: Abdomen is protuberant. There is no distension. Tenderness: There is abdominal tenderness in the right upper quadrant and epigastric area. Positive signs include Abdi's sign. Negative signs include McBurney's sign. Skin:     General: Skin is warm. Neurological:      Mental Status: He is alert and oriented to person, place, and time. Psychiatric:         Mood and Affect: Mood normal.        Medical Decision Making  Amount and/or Complexity of Data Reviewed  Labs: ordered. Decision-making details documented in ED Course. Radiology: ordered. Risk  Prescription drug management. Decision regarding hospitalization.       ED Course as of 01/04/23 1900 Wed Jan 04, 2023   1359 Bilirubin, total: 0.9 [GG]   1420 Patient unsure if he wants to remain in hospital [GG] ED Course User Index  [GG] Reji Kat DO     VITAL SIGNS:  No data found. LABS:  No results found for this or any previous visit (from the past 6 hour(s)). IMAGING:  CT ABD PELV W CONT   Final Result      1. Findings consistent with acute interstitial edematous pancreatitis,   predominantly involving the pancreatic head and uncinate process, with adjacent   duodenitis. Small amount of peripancreatic free fluid without evidence of an   organized fluid collection. 2. Hepatic steatosis. Medications During Visit:  Medications   ondansetron (ZOFRAN) injection 4 mg (4 mg IntraVENous Given 1/4/23 1224)   ketorolac (TORADOL) injection 30 mg (30 mg IntraVENous Given 1/4/23 1252)   iopamidoL (ISOVUE-370) 370 mg iodine /mL (76 %) injection 100 mL (100 mL IntraVENous Given 1/4/23 1305)   HYDROmorphone (DILAUDID) injection 1 mg (1 mg IntraVENous Given 1/4/23 1348)   HYDROmorphone (DILAUDID) injection 1 mg (1 mg IntraVENous Given 1/4/23 1445)         DECISION MAKING:  Mary Tinsley is a 38 y.o. male who comes in as above. Here, patient has pancreatitis. Patient states that he does not drink. He did eat a lot of spicy food last night. This might be the cause. No mass or pseudocyst.  Small amount of fluid is noted however no concern for liver or gallbladder pathology, stone. Discussed patient care with patient, discussed admission for pain control. Patient discussed this with his significant other and after multiple conversations with the patient he is elected to go home with pain medicines and bland diet/liquid diet which she will advance as tolerated. I have a high suspicion that the patient will have to return for pain control and admission however he is declined this admission at this time. We will discharge patient home with return instructions pain medicines and other medicines as below and have patient follow-up with his PCP. IMPRESSION:  1.  Acute pancreatitis, unspecified complication status, unspecified pancreatitis type        DISPOSITION:  Discharged      Discharge Medication List as of 1/4/2023  2:35 PM        START taking these medications    Details   oxyCODONE-acetaminophen (Percocet) 5-325 mg per tablet Take 1 Tablet by mouth every six (6) hours as needed for Pain for up to 3 days. Max Daily Amount: 4 Tablets., Normal, Disp-15 Tablet, R-0      ondansetron (ZOFRAN ODT) 4 mg disintegrating tablet Take 1 Tablet by mouth every eight (8) hours as needed for Nausea for up to 12 doses. , Normal, Disp-12 Tablet, R-0           CONTINUE these medications which have NOT CHANGED    Details   triamcinolone acetonide (KENALOG) 0.1 % topical cream triamcinolone acetonide 0.1 % topical cream, Historical Med              Follow-up Information       Follow up With Specialties Details Why Contact Info    Yolie Sánchez Physician Assistant Schedule an appointment as soon as possible for a visit   [address] [place] [address] [id number] [phone]      [address] [place] [address] Emergency Medicine Go to  If symptoms worsen, As needed [facility] [address]  [phone]              The patient is asked to follow-up with their primary care provider in the next several days. They are to call tomorrow for an appointment. The patient is asked to return promptly for any increased concerns or worsening of symptoms. They can return to this emergency department or any other emergency department.     Procedures

## 2023-01-04 NOTE — ED TRIAGE NOTES
Arrived ambulatory to treatment area with abdominal pain that began this morning after waking at approx 6am. + nausea Denies any vomiting or diarrhea. Denies any fever or chills.

## 2023-08-10 ENCOUNTER — OFFICE VISIT (OUTPATIENT)
Age: 43
End: 2023-08-10
Payer: COMMERCIAL

## 2023-08-10 VITALS
WEIGHT: 264.6 LBS | HEIGHT: 71 IN | BODY MASS INDEX: 37.04 KG/M2 | HEART RATE: 106 BPM | RESPIRATION RATE: 17 BRPM | DIASTOLIC BLOOD PRESSURE: 97 MMHG | SYSTOLIC BLOOD PRESSURE: 139 MMHG | OXYGEN SATURATION: 97 % | TEMPERATURE: 97.8 F

## 2023-08-10 DIAGNOSIS — E83.110 HEREDITARY HEMOCHROMATOSIS (HCC): Primary | ICD-10-CM

## 2023-08-10 PROCEDURE — 99214 OFFICE O/P EST MOD 30 MIN: CPT | Performed by: PHYSICIAN ASSISTANT

## 2023-08-10 PROCEDURE — 91200 LIVER ELASTOGRAPHY: CPT | Performed by: PHYSICIAN ASSISTANT

## 2023-08-10 ASSESSMENT — PATIENT HEALTH QUESTIONNAIRE - PHQ9
2. FEELING DOWN, DEPRESSED OR HOPELESS: 0
SUM OF ALL RESPONSES TO PHQ QUESTIONS 1-9: 0
1. LITTLE INTEREST OR PLEASURE IN DOING THINGS: 0
SUM OF ALL RESPONSES TO PHQ9 QUESTIONS 1 & 2: 0

## 2023-08-10 NOTE — PROGRESS NOTES
MD Lala, FACP, [place], [place]      NICOLAS Grubbs, AGPCNP-BC   [place] Joopal, ACNPC-AG   Jessica Torres, FNBALWINDER-JESSY Meyers, FNP-JESSY Oliveros, AGPCNP-BC   Curvin Holstein, [facility]      [address]   at [facility]   [address] [facility], [address] [facility]   [facility], [address] [facility]   [phone]   FAX: [phone]  Liver [place] [facility]   at [facility], [facility], [address]   [phone]   FAX: [phone]     Patient Care Team:  Lucinda Daniel as PCP - General  ADELIA Daniel as PCP - Empaneled Provider    Patient Active Problem List   Diagnosis    Olecranon bursitis of right elbow    Elevated LFTs    Hereditary hemochromatosis ([address] [facility])    Hidradenitis     Lewis Edu is being seen at [address] [place] [address],[address] [facility] for management of hereditary hemochromatosis. The active problem list, all pertinent past medical history, medications, radiologic findings and laboratory findings related to the liver disorder were reviewed and discussed with the patient. The patient is a 41 y.o.  male who was found to have elevated liver enzymes in 8/2021. The patient has no symptoms which can be attributed to the liver disorder. 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 He tolerated phlebotomy well but his work schedule had made consistency difficult and he went for phlebotomy every 2-3 weeks until the end of 5/2022 (16 units in total). At that time, his insurance changed and he had a $4000 OOP deductible and couldn't proceed with phlebotomy at the [facility].   We had made referral to I as phlebotomy in an office visit can be more affordable, but patient was not able to maintain this

## 2023-08-11 ENCOUNTER — CLINICAL DOCUMENTATION (OUTPATIENT)
Age: 43
End: 2023-08-11

## 2023-08-11 DIAGNOSIS — E83.110 HEREDITARY HEMOCHROMATOSIS (HCC): Primary | ICD-10-CM

## 2023-08-11 LAB
ALBUMIN SERPL-MCNC: 4.1 G/DL (ref 3.5–5)
ALBUMIN/GLOB SERPL: 1.2 (ref 1.1–2.2)
ALP SERPL-CCNC: 110 U/L (ref 45–117)
ALT SERPL-CCNC: 102 U/L (ref 12–78)
ANION GAP SERPL CALC-SCNC: 7 MMOL/L (ref 5–15)
AST SERPL-CCNC: 89 U/L (ref 15–37)
BILIRUB DIRECT SERPL-MCNC: 0.1 MG/DL (ref 0–0.2)
BILIRUB SERPL-MCNC: 0.4 MG/DL (ref 0.2–1)
BUN SERPL-MCNC: 12 MG/DL (ref 6–20)
BUN/CREAT SERPL: 16 (ref 12–20)
CALCIUM SERPL-MCNC: 9.2 MG/DL (ref 8.5–10.1)
CHLORIDE SERPL-SCNC: 109 MMOL/L (ref 97–108)
CO2 SERPL-SCNC: 25 MMOL/L (ref 21–32)
CREAT SERPL-MCNC: 0.76 MG/DL (ref 0.7–1.3)
ERYTHROCYTE [DISTWIDTH] IN BLOOD BY AUTOMATED COUNT: 12.5 % (ref 11.5–14.5)
FERRITIN SERPL-MCNC: 379 NG/ML (ref 26–388)
GLOBULIN SER CALC-MCNC: 3.5 G/DL (ref 2–4)
GLUCOSE SERPL-MCNC: 122 MG/DL (ref 65–100)
HCT VFR BLD AUTO: 45.6 % (ref 36.6–50.3)
HGB BLD-MCNC: 15.6 G/DL (ref 12.1–17)
IRON SATN MFR SERPL: 58 % (ref 20–50)
IRON SERPL-MCNC: 189 UG/DL (ref 35–150)
MCH RBC QN AUTO: 32.7 PG (ref 26–34)
MCHC RBC AUTO-ENTMCNC: 34.2 G/DL (ref 30–36.5)
MCV RBC AUTO: 95.6 FL (ref 80–99)
NRBC # BLD: 0 K/UL (ref 0–0.01)
NRBC BLD-RTO: 0 PER 100 WBC
PLATELET # BLD AUTO: 228 K/UL (ref 150–400)
PMV BLD AUTO: 10.4 FL (ref 8.9–12.9)
POTASSIUM SERPL-SCNC: 4.1 MMOL/L (ref 3.5–5.1)
PROT SERPL-MCNC: 7.6 G/DL (ref 6.4–8.2)
RBC # BLD AUTO: 4.77 M/UL (ref 4.1–5.7)
SODIUM SERPL-SCNC: 141 MMOL/L (ref 136–145)
TIBC SERPL-MCNC: 324 UG/DL (ref 250–450)
WBC # BLD AUTO: 7.9 K/UL (ref 4.1–11.1)

## 2023-08-15 ENCOUNTER — CLINICAL DOCUMENTATION (OUTPATIENT)
Age: 43
End: 2023-08-15

## 2023-08-15 LAB
AFP L3 MFR SERPL: NORMAL % (ref 0–9.9)
AFP SERPL-MCNC: 5.7 NG/ML (ref 0–6.9)

## 2023-08-15 NOTE — PROGRESS NOTES
Hematology/Oncology referral, latest office notes, and last labs faxed to [address] [place] [address] (at [phone]), [facility]    Fax confirmation received.

## 2023-09-26 ENCOUNTER — CLINICAL DOCUMENTATION (OUTPATIENT)
Age: 43
End: 2023-09-26

## 2023-09-26 NOTE — PROGRESS NOTES
Office notes received from [address] [place] [address] (encounter date 9.21.23) and placed in provider's box to review.

## 2024-02-12 ENCOUNTER — OFFICE VISIT (OUTPATIENT)
Age: 44
End: 2024-02-12
Payer: COMMERCIAL

## 2024-02-12 VITALS
DIASTOLIC BLOOD PRESSURE: 93 MMHG | HEART RATE: 92 BPM | HEIGHT: 71 IN | TEMPERATURE: 97.2 F | SYSTOLIC BLOOD PRESSURE: 149 MMHG | BODY MASS INDEX: 36.12 KG/M2 | OXYGEN SATURATION: 98 % | WEIGHT: 258 LBS

## 2024-02-12 DIAGNOSIS — E83.110 HEREDITARY HEMOCHROMATOSIS (HCC): Primary | ICD-10-CM

## 2024-02-12 PROCEDURE — 99213 OFFICE O/P EST LOW 20 MIN: CPT | Performed by: PHYSICIAN ASSISTANT

## 2024-02-12 RX ORDER — OLMESARTAN MEDOXOMIL 20 MG/1
20 TABLET ORAL DAILY
Qty: 30 TABLET | Refills: 1 | Status: SHIPPED | OUTPATIENT
Start: 2024-02-12

## 2024-02-12 NOTE — PROGRESS NOTES
Identified pt with two pt identifiers(name and ). Reviewed record in preparation for visit and have obtained necessary documentation.    Chief Complaint   Patient presents with    Follow-up     BP (!) 149/93 (Site: Left Upper Arm, Position: Sitting, Cuff Size: Large Adult)   Pulse 92   Temp 97.2 °F (36.2 °C) (Temporal)   Ht 1.803 m (5' 11\")   Wt 117 kg (258 lb)   SpO2 98%   BMI 35.98 kg/m²       1. \"Have you been to the ER, urgent care clinic since your last visit?  Hospitalized since your last visit?\"  Yes PER pt for some stomach pain in 2023 @ [facility]     2. \"Have you seen or consulted any other health care providers outside of the [facility] since your last visit?\" No     Patient is accompanied by self  I have received verbal consent from Garcia Strong to discuss any/all medical information while they are present in the room.        
No oral lesions.   Nodes: No adenopathy.   Skin: No spider angiomata. No jaundice. No palmar erythema.  Respiratory: No wheezing, respiratory distress, cyanosis.    Cardiovascular: No JVD.  Abdomen: Obese, soft/nontender, no palpable liver edge and no obvious ascites.    Extremities: No edema. No muscle wasting. No gross arthritic changes.  Neurologic: Alert and oriented. Cranial nerves grossly intact. No asterixis.    LABORATORY STUDIES:  From 9/2023  AST/ALT/ALP/T Bili/ALB:  136/104/105/0.5/4.5  WBC/HB/PLT/INR:   NA/BUN/CREAT:  139/11/0.87  Iron 163, Ferritin 592, TS 52%.    C282Y homozygous.      Latest Ref Rng 9/6/2022 1/4/2023 8/10/2023   [facility] - Routine Labs       WBC 4.1 - 11.1 K/uL 9.3  10.9  7.9    ANC 1.8 - 8.0 K/UL  8.4 (H)     HGB 12.1 - 17.0 g/dL 16.1  16.4  15.6    HGB (iSTAT) 12.1 - 17.0 g/dL       - 400 K/uL 242  216  228    INR 0.9 - 1.2 NA      AST 15 - 37 U/L 60 (H)  38 (H)  89 (H)    ALT 12 - 78 U/L 54 (H)  66  102 (H)    Alk Phos 45 - 117 U/L 116  109  110    Bili, Total 0.2 - 1.0 MG/DL 0.6  0.9  0.4    Bili, Direct 0.0 - 0.2 MG/DL 0.18   0.1    Albumin 3.5 - 5.0 g/dL 4.8  4.1  4.1    BUN 6 - 20 MG/DL 10  6  12    Creat 0.70 - 1.30 MG/DL 0.76  1.00  0.76    Na 136 - 145 mmol/L 139  137  141    K 3.5 - 5.1 mmol/L 4.6  3.7  4.1    Cl 97 - 108 mmol/L 99  98  109 (H)    CO2 21 - 32 mmol/L 24  27  25    Glucose 65 - 100 mg/dL 117 (H)  176 (H)  122 (H)        Latest Ref Rng 9/6/2022 8/10/2023   [facility] - Cancer Screening      AFP 0.0 - 6.9 ng/mL 4.4  5.7    AFP-L3% 0.0 - 9.9 % Comment  Comment       Latest Ref Rng 9/28/2021 1/27/2022 9/6/2022 8/10/2023   [facility] - Serologies        Hep B Surface Ag Negative  Negative       Hep B Core Ab, Total Negative  Negative       Hep B Surface AB QL  Non Reactive       Hep C Ab 0.0 - 0.9 s/co ratio       Ferritin 26 - 388 NG/ML 1,870 (H)  626 (H)  157  379    Iron % Saturation 20 - 50 % >94 (HH)  50  36  58 (H)    C-ANCA Neg:<1:20 titer <1:20       ASMCA 0 - 19

## 2024-02-27 LAB
BUN SERPL-MCNC: 9 MG/DL (ref 6–24)
BUN/CREAT SERPL: 11 (ref 9–20)
CALCIUM SERPL-MCNC: 9.4 MG/DL (ref 8.7–10.2)
CHLORIDE SERPL-SCNC: 102 MMOL/L (ref 96–106)
CO2 SERPL-SCNC: 21 MMOL/L (ref 20–29)
CREAT SERPL-MCNC: 0.8 MG/DL (ref 0.76–1.27)
EGFRCR SERPLBLD CKD-EPI 2021: 114 ML/MIN/1.73
ERYTHROCYTE [DISTWIDTH] IN BLOOD BY AUTOMATED COUNT: 13 % (ref 11.6–15.4)
FERRITIN SERPL-MCNC: 338 NG/ML (ref 30–400)
GLUCOSE SERPL-MCNC: 162 MG/DL (ref 70–99)
HCT VFR BLD AUTO: 46.3 % (ref 37.5–51)
HGB BLD-MCNC: 15.5 G/DL (ref 13–17.7)
IRON SATN MFR SERPL: >95 % (ref 15–55)
IRON SERPL-MCNC: 298 UG/DL (ref 38–169)
MCH RBC QN AUTO: 32.4 PG (ref 26.6–33)
MCHC RBC AUTO-ENTMCNC: 33.5 G/DL (ref 31.5–35.7)
MCV RBC AUTO: 97 FL (ref 79–97)
PLATELET # BLD AUTO: 206 X10E3/UL (ref 150–450)
POTASSIUM SERPL-SCNC: 4.6 MMOL/L (ref 3.5–5.2)
RBC # BLD AUTO: 4.79 X10E6/UL (ref 4.14–5.8)
SODIUM SERPL-SCNC: 144 MMOL/L (ref 134–144)
TIBC SERPL-MCNC: <315 UG/DL (ref 250–450)
UIBC SERPL-MCNC: <17 UG/DL (ref 111–343)
WBC # BLD AUTO: 7.9 X10E3/UL (ref 3.4–10.8)

## 2024-02-28 LAB
AFP L3 MFR SERPL: NORMAL % (ref 0–9.9)
AFP SERPL-MCNC: 4.4 NG/ML (ref 0–6.9)
ALBUMIN SERPL-MCNC: 4.5 G/DL (ref 4.1–5.1)
ALP SERPL-CCNC: 101 IU/L (ref 44–121)
ALT SERPL-CCNC: 35 IU/L (ref 0–44)
AST SERPL-CCNC: 31 IU/L (ref 0–40)
BILIRUB DIRECT SERPL-MCNC: 0.14 MG/DL (ref 0–0.4)
BILIRUB SERPL-MCNC: 0.8 MG/DL (ref 0–1.2)
PROT SERPL-MCNC: 7.1 G/DL (ref 6–8.5)

## 2024-03-08 RX ORDER — OLMESARTAN MEDOXOMIL 20 MG/1
20 TABLET ORAL DAILY
Qty: 30 TABLET | Refills: 1 | Status: SHIPPED | OUTPATIENT
Start: 2024-03-08

## 2024-05-06 ENCOUNTER — HOSPITAL ENCOUNTER (OUTPATIENT)
Facility: HOSPITAL | Age: 44
Discharge: HOME | End: 2024-05-09
Payer: COMMERCIAL

## 2024-05-06 DIAGNOSIS — E83.110 HEREDITARY HEMOCHROMATOSIS (HCC): ICD-10-CM

## 2024-05-06 PROCEDURE — 76705 ECHO EXAM OF ABDOMEN: CPT

## 2024-05-10 DIAGNOSIS — E83.110 HEREDITARY HEMOCHROMATOSIS (HCC): Primary | ICD-10-CM

## 2024-05-29 ENCOUNTER — TELEPHONE (OUTPATIENT)
Age: 44
End: 2024-05-29

## 2024-05-29 NOTE — TELEPHONE ENCOUNTER
Chela called from [facility]   Requesting labs and and last office note   Labs and last office note sent via fax: [phone]

## 2024-06-07 ENCOUNTER — TELEPHONE (OUTPATIENT)
Age: 44
End: 2024-06-07

## 2024-06-07 NOTE — TELEPHONE ENCOUNTER
Silke from [facility] has requested office notes, labs and scans. Faxed has been sent over to [phone]

## 2024-06-17 ENCOUNTER — HOSPITAL ENCOUNTER (OUTPATIENT)
Facility: HOSPITAL | Age: 44
Discharge: HOME | End: 2024-06-20
Payer: COMMERCIAL

## 2024-06-17 DIAGNOSIS — E83.110 HEREDITARY HEMOCHROMATOSIS (HCC): ICD-10-CM

## 2024-06-17 PROCEDURE — 74170 CT ABD WO CNTRST FLWD CNTRST: CPT

## 2024-06-17 PROCEDURE — 6360000004 HC RX CONTRAST MEDICATION: Performed by: PHYSICIAN ASSISTANT

## 2024-06-17 RX ORDER — OLMESARTAN MEDOXOMIL 20 MG/1
20 TABLET ORAL DAILY
Qty: 90 TABLET | Refills: 0 | Status: SHIPPED | OUTPATIENT
Start: 2024-06-17

## 2024-06-17 RX ADMIN — IOPAMIDOL 100 ML: 755 INJECTION, SOLUTION INTRAVENOUS at 12:27

## 2024-08-12 ENCOUNTER — OFFICE VISIT (OUTPATIENT)
Age: 44
End: 2024-08-12
Payer: COMMERCIAL

## 2024-08-12 VITALS
HEIGHT: 71 IN | TEMPERATURE: 97.8 F | OXYGEN SATURATION: 98 % | WEIGHT: 260 LBS | BODY MASS INDEX: 36.4 KG/M2 | HEART RATE: 86 BPM | SYSTOLIC BLOOD PRESSURE: 118 MMHG | RESPIRATION RATE: 18 BRPM | DIASTOLIC BLOOD PRESSURE: 85 MMHG

## 2024-08-12 DIAGNOSIS — E83.110 HEREDITARY HEMOCHROMATOSIS (HCC): Primary | ICD-10-CM

## 2024-08-12 PROCEDURE — 99213 OFFICE O/P EST LOW 20 MIN: CPT | Performed by: PHYSICIAN ASSISTANT

## 2024-08-12 ASSESSMENT — PATIENT HEALTH QUESTIONNAIRE - PHQ9
SUM OF ALL RESPONSES TO PHQ QUESTIONS 1-9: 0
SUM OF ALL RESPONSES TO PHQ QUESTIONS 1-9: 0
DEPRESSION UNABLE TO ASSESS: FUNCTIONAL CAPACITY MOTIVATION LIMITS ACCURACY
SUM OF ALL RESPONSES TO PHQ9 QUESTIONS 1 & 2: 0
1. LITTLE INTEREST OR PLEASURE IN DOING THINGS: NOT AT ALL
2. FEELING DOWN, DEPRESSED OR HOPELESS: NOT AT ALL
SUM OF ALL RESPONSES TO PHQ QUESTIONS 1-9: 0
SUM OF ALL RESPONSES TO PHQ QUESTIONS 1-9: 0

## 2024-08-12 NOTE — PROGRESS NOTES
Identified pt with two pt identifiers(name and ). Reviewed record in preparation for visit and have obtained necessary documentation.  Vitals:    24 0931 24 0933   BP: (!) 157/98 118/85   Site: Left Upper Arm    Position: Sitting    Cuff Size: Medium Adult    Pulse: 86    Resp: 18    Temp: 97.8 °F (36.6 °C)    TempSrc: Temporal    SpO2: 98%    Weight: 117.9 kg (260 lb)    Height: 1.803 m (5' 11\")         Health Maintenance Review: Patient reminded of \"due or due soon\" health maintenance. I have asked the patient to contact his/her primary care provider (PCP) for follow-up on his/her health maintenance.    Coordination of Care Questionnaire:  :   1) Have you been to an emergency room, urgent care, or hospitalized since your last visit?  If yes, where when, and reason for visit? no       2. Have seen or consulted any other health care provider since your last visit?   If yes, where when, and reason for visit?  No      Patient is accompanied by self I have received verbal consent from Garcia Strong to discuss any/all medical information while they are present in the room.   
dilated bile ducts.  No ascites.  6/2024.  CT abdomen. Hypoattenuation of the liver consistent with hepatic steatosis. Widening of the fissures and subtle surface nodularity suggestive of cirrhosis. No  arterially enhancing liver lesion. Patent hepatic vasculature. No corresponding  lesion in the left lobe to correlate to the ultrasound finding.    OTHER TESTING:  Not available or performed    FOLLOW-UP:  All of the issues listed above in the assessment and plan were discussed with the patient. All questions were answered. The patient expressed a clear understanding of the above.    Follow-up [facility] in 6 months with annual imaging at that time.  I have given him a lab order to have obtained at time of next phlebotomy.     Isabel Puente PA-C  [facility]  [address] [place] [address], [address]  [place]  [address] [phone]  [facility]

## 2024-10-25 RX ORDER — OLMESARTAN MEDOXOMIL 20 MG/1
20 TABLET ORAL DAILY
Qty: 90 TABLET | Refills: 0 | Status: SHIPPED | OUTPATIENT
Start: 2024-10-25

## 2024-10-26 ENCOUNTER — HOSPITAL ENCOUNTER (EMERGENCY)
Facility: HOSPITAL | Age: 44
Discharge: HOME | DRG: 440 | End: 2024-10-26
Attending: STUDENT IN AN ORGANIZED HEALTH CARE EDUCATION/TRAINING PROGRAM
Payer: COMMERCIAL

## 2024-10-26 VITALS
RESPIRATION RATE: 20 BRPM | OXYGEN SATURATION: 100 % | HEIGHT: 71 IN | HEART RATE: 85 BPM | TEMPERATURE: 98.1 F | SYSTOLIC BLOOD PRESSURE: 167 MMHG | BODY MASS INDEX: 35.4 KG/M2 | DIASTOLIC BLOOD PRESSURE: 84 MMHG | WEIGHT: 252.87 LBS

## 2024-10-26 DIAGNOSIS — K85.90 ACUTE PANCREATITIS, UNSPECIFIED COMPLICATION STATUS, UNSPECIFIED PANCREATITIS TYPE: Primary | ICD-10-CM

## 2024-10-26 LAB
ALBUMIN SERPL-MCNC: 4 G/DL (ref 3.5–5)
ALBUMIN/GLOB SERPL: 0.9 (ref 1.1–2.2)
ALP SERPL-CCNC: 121 U/L (ref 45–117)
ALT SERPL-CCNC: 64 U/L (ref 12–78)
ANION GAP SERPL CALC-SCNC: 8 MMOL/L (ref 2–12)
APPEARANCE UR: CLEAR
AST SERPL-CCNC: 52 U/L (ref 15–37)
BACTERIA URNS QL MICRO: NEGATIVE /HPF
BASOPHILS # BLD: 0.1 K/UL (ref 0–0.1)
BASOPHILS NFR BLD: 1 % (ref 0–1)
BILIRUB SERPL-MCNC: 0.8 MG/DL (ref 0.2–1)
BILIRUB UR QL CFM: NEGATIVE
BUN SERPL-MCNC: 6 MG/DL (ref 6–20)
BUN/CREAT SERPL: 7 (ref 12–20)
CALCIUM SERPL-MCNC: 9.1 MG/DL (ref 8.5–10.1)
CHLORIDE SERPL-SCNC: 100 MMOL/L (ref 97–108)
CO2 SERPL-SCNC: 25 MMOL/L (ref 21–32)
COLOR UR: ABNORMAL
COMMENT:: NORMAL
CREAT SERPL-MCNC: 0.89 MG/DL (ref 0.7–1.3)
DIFFERENTIAL METHOD BLD: NORMAL
EOSINOPHIL # BLD: 0.3 K/UL (ref 0–0.4)
EOSINOPHIL NFR BLD: 3 % (ref 0–7)
EPITH CASTS URNS QL MICRO: ABNORMAL /LPF
ERYTHROCYTE [DISTWIDTH] IN BLOOD BY AUTOMATED COUNT: 12.6 % (ref 11.5–14.5)
GLOBULIN SER CALC-MCNC: 4.4 G/DL (ref 2–4)
GLUCOSE SERPL-MCNC: 240 MG/DL (ref 65–100)
GLUCOSE UR STRIP.AUTO-MCNC: 250 MG/DL
HCT VFR BLD AUTO: 42.3 % (ref 36.6–50.3)
HGB BLD-MCNC: 15.3 G/DL (ref 12.1–17)
HGB UR QL STRIP: ABNORMAL
IMM GRANULOCYTES # BLD AUTO: 0 K/UL (ref 0–0.04)
IMM GRANULOCYTES NFR BLD AUTO: 0 % (ref 0–0.5)
KETONES UR QL STRIP.AUTO: ABNORMAL MG/DL
LEUKOCYTE ESTERASE UR QL STRIP.AUTO: NEGATIVE
LIPASE SERPL-CCNC: 1937 U/L (ref 13–75)
LYMPHOCYTES # BLD: 2.2 K/UL (ref 0.8–3.5)
LYMPHOCYTES NFR BLD: 23 % (ref 12–49)
MCH RBC QN AUTO: 32.1 PG (ref 26–34)
MCHC RBC AUTO-ENTMCNC: 36.2 G/DL (ref 30–36.5)
MCV RBC AUTO: 88.7 FL (ref 80–99)
MONOCYTES # BLD: 0.7 K/UL (ref 0–1)
MONOCYTES NFR BLD: 7 % (ref 5–13)
NEUTS SEG # BLD: 6.4 K/UL (ref 1.8–8)
NEUTS SEG NFR BLD: 66 % (ref 32–75)
NITRITE UR QL STRIP.AUTO: NEGATIVE
NRBC # BLD: 0 K/UL (ref 0–0.01)
NRBC BLD-RTO: 0 PER 100 WBC
PH UR STRIP: 6.5 (ref 5–8)
PLATELET # BLD AUTO: 229 K/UL (ref 150–400)
PMV BLD AUTO: 10.3 FL (ref 8.9–12.9)
POTASSIUM SERPL-SCNC: 4.1 MMOL/L (ref 3.5–5.1)
PROT SERPL-MCNC: 8.4 G/DL (ref 6.4–8.2)
PROT UR STRIP-MCNC: 100 MG/DL
RBC # BLD AUTO: 4.77 M/UL (ref 4.1–5.7)
RBC #/AREA URNS HPF: ABNORMAL /HPF (ref 0–5)
SODIUM SERPL-SCNC: 133 MMOL/L (ref 136–145)
SP GR UR REFRACTOMETRY: >1.03 (ref 1–1.03)
SPECIMEN HOLD: NORMAL
UROBILINOGEN UR QL STRIP.AUTO: 1 EU/DL (ref 0.2–1)
WBC # BLD AUTO: 9.6 K/UL (ref 4.1–11.1)
WBC URNS QL MICRO: ABNORMAL /HPF (ref 0–4)

## 2024-10-26 PROCEDURE — 96375 TX/PRO/DX INJ NEW DRUG ADDON: CPT

## 2024-10-26 PROCEDURE — 83690 ASSAY OF LIPASE: CPT

## 2024-10-26 PROCEDURE — 80053 COMPREHEN METABOLIC PANEL: CPT

## 2024-10-26 PROCEDURE — 99284 EMERGENCY DEPT VISIT MOD MDM: CPT

## 2024-10-26 PROCEDURE — 81001 URINALYSIS AUTO W/SCOPE: CPT

## 2024-10-26 PROCEDURE — 6370000000 HC RX 637 (ALT 250 FOR IP): Performed by: STUDENT IN AN ORGANIZED HEALTH CARE EDUCATION/TRAINING PROGRAM

## 2024-10-26 PROCEDURE — 96374 THER/PROPH/DIAG INJ IV PUSH: CPT

## 2024-10-26 PROCEDURE — 85025 COMPLETE CBC W/AUTO DIFF WBC: CPT

## 2024-10-26 PROCEDURE — 36415 COLL VENOUS BLD VENIPUNCTURE: CPT

## 2024-10-26 PROCEDURE — 6360000002 HC RX W HCPCS: Performed by: STUDENT IN AN ORGANIZED HEALTH CARE EDUCATION/TRAINING PROGRAM

## 2024-10-26 RX ORDER — OXYCODONE HYDROCHLORIDE 5 MG/1
5 TABLET ORAL
Status: COMPLETED | OUTPATIENT
Start: 2024-10-26 | End: 2024-10-26

## 2024-10-26 RX ORDER — OXYCODONE HYDROCHLORIDE 5 MG/1
5 TABLET ORAL EVERY 6 HOURS PRN
Qty: 12 TABLET | Refills: 0 | Status: SHIPPED | OUTPATIENT
Start: 2024-10-26 | End: 2024-10-29

## 2024-10-26 RX ORDER — ONDANSETRON 2 MG/ML
4 INJECTION INTRAMUSCULAR; INTRAVENOUS ONCE
Status: COMPLETED | OUTPATIENT
Start: 2024-10-26 | End: 2024-10-26

## 2024-10-26 RX ORDER — OMEPRAZOLE 10 MG/1
10 CAPSULE, DELAYED RELEASE ORAL DAILY
COMMUNITY

## 2024-10-26 RX ORDER — ONDANSETRON 4 MG/1
4 TABLET, ORALLY DISINTEGRATING ORAL 3 TIMES DAILY PRN
Qty: 21 TABLET | Refills: 0 | Status: SHIPPED | OUTPATIENT
Start: 2024-10-26

## 2024-10-26 RX ADMIN — ONDANSETRON 4 MG: 2 INJECTION INTRAMUSCULAR; INTRAVENOUS at 08:58

## 2024-10-26 RX ADMIN — OXYCODONE HYDROCHLORIDE 5 MG: 5 TABLET ORAL at 11:12

## 2024-10-26 RX ADMIN — HYDROMORPHONE HYDROCHLORIDE 1 MG: 1 INJECTION, SOLUTION INTRAMUSCULAR; INTRAVENOUS; SUBCUTANEOUS at 09:36

## 2024-10-26 ASSESSMENT — PAIN DESCRIPTION - DESCRIPTORS
DESCRIPTORS: ACHING

## 2024-10-26 ASSESSMENT — LIFESTYLE VARIABLES
HOW MANY STANDARD DRINKS CONTAINING ALCOHOL DO YOU HAVE ON A TYPICAL DAY: 1 OR 2
HOW OFTEN DO YOU HAVE A DRINK CONTAINING ALCOHOL: MONTHLY OR LESS

## 2024-10-26 ASSESSMENT — PAIN SCALES - GENERAL
PAINLEVEL_OUTOF10: 5
PAINLEVEL_OUTOF10: 7

## 2024-10-26 ASSESSMENT — PAIN DESCRIPTION - FREQUENCY: FREQUENCY: CONTINUOUS

## 2024-10-26 ASSESSMENT — PAIN - FUNCTIONAL ASSESSMENT: PAIN_FUNCTIONAL_ASSESSMENT: ACTIVITIES ARE NOT PREVENTED

## 2024-10-26 ASSESSMENT — PAIN DESCRIPTION - ORIENTATION
ORIENTATION: RIGHT;LEFT
ORIENTATION: MID;UPPER

## 2024-10-26 ASSESSMENT — PAIN DESCRIPTION - PAIN TYPE: TYPE: ACUTE PAIN

## 2024-10-26 ASSESSMENT — PAIN DESCRIPTION - LOCATION
LOCATION: ABDOMEN

## 2024-10-26 NOTE — ED PROVIDER NOTES
[facility] EMERGENCY DEPT  EMERGENCY DEPARTMENT ENCOUNTER      Pt Name: Garcia Strong  MRN: [id number]  Birthdate 1980  Date of evaluation: 10/26/2024  Provider: Louise Smith DO    CHIEF COMPLAINT       Chief Complaint   Patient presents with    Abdominal Pain    Vomiting       PMH   Past Medical History:   Diagnosis Date    Hemochromatosis     Liver disease     \"stage 3\"    Pancreatitis          MDM:   Vitals:    Vitals:    10/26/24 0936   BP:    Pulse:    Resp: 20   Temp:    SpO2:            This is a 44 y.o. male with pmhx hemochromatosis, pancreatitis who presents today for cc of abdominal pain. Patient states that a few days ago he started having epigastric and RUQ pain like his last episode of pancreatitis, denies alcohol intake prior but does admit to an unhealthy diet. The last few days he has had slim jims, spicy sausage and pepperoni pizza, fried shrimp. He had an episode of vomiting this morning, NBNB, and feels like everytime he goes to eat it is going to cause his pain to worsen so he has been overall eating less. He denies chest pain, dyspnea, fever, urinary symptoms, changes in bowel habits. Describes his pain as 7/10 on the pain scale, nonradiating, sharp.      On arrival VS stable.   Physical Exam  General: Alert, no acute distress, obese  HEENT: Normocephalic, atraumatic. EOMI, moist oral mucosa, no conjunctival injection  Neck: ROM normal, supple  Cardio: Heart regular rate and rhythm, cap refill <2seconds  Lungs: No respiratory distress, no wheezing, CTAB  Abdomen: soft, minimally tender in the RUQ and epigastrium, negative massey's sign. No rebound or guarding.   MSK: ROM normal, no LE edema  Skin: Warm, dry, no rash  Neuro: No focal neurodeficits, Aox3    Patient given Zofran and Dilaudid for pain control.    Labs notable for elevated lipase at 1937.  Suspect acute pancreatitis.  Patient refusing CT and ultrasound, was agreeable to a brief bedside ultrasound by myself.  I did look at the  a cardiologist.        RADIOLOGY:   Non-plain film images such as CT, Ultrasound and MRI are read by the radiologist.    Interpretation per the Radiologist below, if available at the time of this note:    No orders to display        LABS:  Labs Reviewed   URINALYSIS WITH MICROSCOPIC - Abnormal; Notable for the following components:       Result Value    Specific Gravity, UA >1.030 (*)     Protein,  (*)     Glucose, Ur 250 (*)     Ketones, Urine TRACE (*)     Blood, Urine TRACE (*)     All other components within normal limits   CBC WITH AUTO DIFFERENTIAL   EXTRA TUBES HOLD   BILIRUBIN, CONFIRMATORY   LIPASE   COMPREHENSIVE METABOLIC PANEL       All other labs were within normal range or not returned as of this dictation.        PROCEDURES:  Unless otherwise noted below, none     Procedures    See MDM for documentation of critical care time.       FINAL IMPRESSION    No diagnosis found.      DISPOSITION/PLAN   DISPOSITION        PATIENT REFERRED TO:  No follow-up provider specified.    DISCHARGE MEDICATIONS:  New Prescriptions    No medications on file         (Please note that portions of this note were completed with a voice recognition program.  Efforts were made to edit the dictations but occasionally words are mis-transcribed.)    Louise Smith DO (electronically signed)  Emergency Attending Physician / Physician Assistant / Nurse Practitioner             Louise Smith DO  10/27/24 0722

## 2024-10-26 NOTE — DISCHARGE INSTRUCTIONS
Eat a clear liquid diet for the next 3 days, if you are feeling better you can start slowly introducing in a low-fat diet.

## 2024-10-26 NOTE — ED TRIAGE NOTES
Pt ambulated to the treatment area with a steady gait. Pt states \"I know what this is I have Pancreatitis It started Thursday with pain in my stomach then it felt a little better yesterday today it was worse and I started vomiting.\" Pt denies fevers.

## 2024-10-29 ENCOUNTER — HOSPITAL ENCOUNTER (INPATIENT)
Facility: HOSPITAL | Age: 44
LOS: 2 days | Discharge: HOME | DRG: 440 | End: 2024-10-31
Attending: EMERGENCY MEDICINE | Admitting: INTERNAL MEDICINE
Payer: COMMERCIAL

## 2024-10-29 ENCOUNTER — APPOINTMENT (OUTPATIENT)
Facility: HOSPITAL | Age: 44
DRG: 440 | End: 2024-10-29
Payer: COMMERCIAL

## 2024-10-29 DIAGNOSIS — K85.90 ACUTE PANCREATITIS, UNSPECIFIED COMPLICATION STATUS, UNSPECIFIED PANCREATITIS TYPE: Primary | ICD-10-CM

## 2024-10-29 DIAGNOSIS — R11.2 NAUSEA AND VOMITING, UNSPECIFIED VOMITING TYPE: ICD-10-CM

## 2024-10-29 DIAGNOSIS — R10.13 ABDOMINAL PAIN, EPIGASTRIC: ICD-10-CM

## 2024-10-29 LAB
ALBUMIN SERPL-MCNC: 3.7 G/DL (ref 3.5–5)
ALBUMIN/GLOB SERPL: 0.9 (ref 1.1–2.2)
ALP SERPL-CCNC: 213 U/L (ref 45–117)
ALT SERPL-CCNC: 96 U/L (ref 12–78)
ANION GAP SERPL CALC-SCNC: 11 MMOL/L (ref 2–12)
AST SERPL-CCNC: 108 U/L (ref 15–37)
BASOPHILS # BLD: 0.1 K/UL (ref 0–0.1)
BASOPHILS NFR BLD: 1 % (ref 0–1)
BILIRUB SERPL-MCNC: 1.2 MG/DL (ref 0.2–1)
BUN SERPL-MCNC: 8 MG/DL (ref 6–20)
BUN/CREAT SERPL: 8 (ref 12–20)
CALCIUM SERPL-MCNC: 9.2 MG/DL (ref 8.5–10.1)
CHLORIDE SERPL-SCNC: 99 MMOL/L (ref 97–108)
CO2 SERPL-SCNC: 25 MMOL/L (ref 21–32)
COMMENT:: NORMAL
CREAT SERPL-MCNC: 1.02 MG/DL (ref 0.7–1.3)
DIFFERENTIAL METHOD BLD: ABNORMAL
EOSINOPHIL # BLD: 0.3 K/UL (ref 0–0.4)
EOSINOPHIL NFR BLD: 2 % (ref 0–7)
ERYTHROCYTE [DISTWIDTH] IN BLOOD BY AUTOMATED COUNT: 12.6 % (ref 11.5–14.5)
GLOBULIN SER CALC-MCNC: 4.3 G/DL (ref 2–4)
GLUCOSE SERPL-MCNC: 280 MG/DL (ref 65–100)
HCT VFR BLD AUTO: 42.9 % (ref 36.6–50.3)
HGB BLD-MCNC: 15.3 G/DL (ref 12.1–17)
IMM GRANULOCYTES # BLD AUTO: 0.1 K/UL (ref 0–0.04)
IMM GRANULOCYTES NFR BLD AUTO: 1 % (ref 0–0.5)
LIPASE SERPL-CCNC: 2192 U/L (ref 13–75)
LYMPHOCYTES # BLD: 1.6 K/UL (ref 0.8–3.5)
LYMPHOCYTES NFR BLD: 13 % (ref 12–49)
MCH RBC QN AUTO: 31.2 PG (ref 26–34)
MCHC RBC AUTO-ENTMCNC: 35.7 G/DL (ref 30–36.5)
MCV RBC AUTO: 87.4 FL (ref 80–99)
MONOCYTES # BLD: 1.1 K/UL (ref 0–1)
MONOCYTES NFR BLD: 9 % (ref 5–13)
NEUTS SEG # BLD: 9.6 K/UL (ref 1.8–8)
NEUTS SEG NFR BLD: 74 % (ref 32–75)
NRBC # BLD: 0 K/UL (ref 0–0.01)
NRBC BLD-RTO: 0 PER 100 WBC
PLATELET # BLD AUTO: 265 K/UL (ref 150–400)
PMV BLD AUTO: 10.3 FL (ref 8.9–12.9)
POTASSIUM SERPL-SCNC: 3.9 MMOL/L (ref 3.5–5.1)
PROT SERPL-MCNC: 8 G/DL (ref 6.4–8.2)
RBC # BLD AUTO: 4.91 M/UL (ref 4.1–5.7)
SODIUM SERPL-SCNC: 135 MMOL/L (ref 136–145)
SPECIMEN HOLD: NORMAL
WBC # BLD AUTO: 12.8 K/UL (ref 4.1–11.1)

## 2024-10-29 PROCEDURE — 74177 CT ABD & PELVIS W/CONTRAST: CPT

## 2024-10-29 PROCEDURE — 80053 COMPREHEN METABOLIC PANEL: CPT

## 2024-10-29 PROCEDURE — 85025 COMPLETE CBC W/AUTO DIFF WBC: CPT

## 2024-10-29 PROCEDURE — 36415 COLL VENOUS BLD VENIPUNCTURE: CPT

## 2024-10-29 PROCEDURE — 99285 EMERGENCY DEPT VISIT HI MDM: CPT

## 2024-10-29 PROCEDURE — 6360000004 HC RX CONTRAST MEDICATION: Performed by: EMERGENCY MEDICINE

## 2024-10-29 PROCEDURE — 2580000003 HC RX 258: Performed by: EMERGENCY MEDICINE

## 2024-10-29 PROCEDURE — 1100000000 HC RM PRIVATE

## 2024-10-29 PROCEDURE — 83036 HEMOGLOBIN GLYCOSYLATED A1C: CPT

## 2024-10-29 PROCEDURE — 83690 ASSAY OF LIPASE: CPT

## 2024-10-29 PROCEDURE — 96375 TX/PRO/DX INJ NEW DRUG ADDON: CPT

## 2024-10-29 PROCEDURE — 6360000002 HC RX W HCPCS: Performed by: EMERGENCY MEDICINE

## 2024-10-29 PROCEDURE — 96374 THER/PROPH/DIAG INJ IV PUSH: CPT

## 2024-10-29 RX ORDER — SODIUM CHLORIDE, SODIUM LACTATE, POTASSIUM CHLORIDE, CALCIUM CHLORIDE 600; 310; 30; 20 MG/100ML; MG/100ML; MG/100ML; MG/100ML
INJECTION, SOLUTION INTRAVENOUS CONTINUOUS
Status: DISPENSED | OUTPATIENT
Start: 2024-10-29 | End: 2024-10-30

## 2024-10-29 RX ORDER — IOPAMIDOL 755 MG/ML
100 INJECTION, SOLUTION INTRAVASCULAR
Status: COMPLETED | OUTPATIENT
Start: 2024-10-29 | End: 2024-10-29

## 2024-10-29 RX ORDER — SODIUM CHLORIDE 0.9 % (FLUSH) 0.9 %
5-40 SYRINGE (ML) INJECTION PRN
Status: DISCONTINUED | OUTPATIENT
Start: 2024-10-29 | End: 2024-10-31 | Stop reason: HOSPADM

## 2024-10-29 RX ORDER — 0.9 % SODIUM CHLORIDE 0.9 %
1000 INTRAVENOUS SOLUTION INTRAVENOUS ONCE
Status: DISCONTINUED | OUTPATIENT
Start: 2024-10-29 | End: 2024-10-29

## 2024-10-29 RX ORDER — ONDANSETRON 4 MG/1
4 TABLET, ORALLY DISINTEGRATING ORAL EVERY 8 HOURS PRN
Status: DISCONTINUED | OUTPATIENT
Start: 2024-10-29 | End: 2024-10-31 | Stop reason: HOSPADM

## 2024-10-29 RX ORDER — 0.9 % SODIUM CHLORIDE 0.9 %
1000 INTRAVENOUS SOLUTION INTRAVENOUS ONCE
Status: COMPLETED | OUTPATIENT
Start: 2024-10-29 | End: 2024-10-30

## 2024-10-29 RX ORDER — ONDANSETRON 2 MG/ML
4 INJECTION INTRAMUSCULAR; INTRAVENOUS ONCE
Status: COMPLETED | OUTPATIENT
Start: 2024-10-29 | End: 2024-10-29

## 2024-10-29 RX ORDER — ONDANSETRON 2 MG/ML
4 INJECTION INTRAMUSCULAR; INTRAVENOUS EVERY 6 HOURS PRN
Status: DISCONTINUED | OUTPATIENT
Start: 2024-10-29 | End: 2024-10-31 | Stop reason: HOSPADM

## 2024-10-29 RX ORDER — SODIUM CHLORIDE 0.9 % (FLUSH) 0.9 %
5-40 SYRINGE (ML) INJECTION EVERY 12 HOURS SCHEDULED
Status: DISCONTINUED | OUTPATIENT
Start: 2024-10-29 | End: 2024-10-31 | Stop reason: HOSPADM

## 2024-10-29 RX ORDER — SODIUM CHLORIDE, SODIUM LACTATE, POTASSIUM CHLORIDE, CALCIUM CHLORIDE 600; 310; 30; 20 MG/100ML; MG/100ML; MG/100ML; MG/100ML
INJECTION, SOLUTION INTRAVENOUS CONTINUOUS
Status: DISCONTINUED | OUTPATIENT
Start: 2024-10-30 | End: 2024-10-31 | Stop reason: HOSPADM

## 2024-10-29 RX ORDER — SODIUM CHLORIDE 9 MG/ML
INJECTION, SOLUTION INTRAVENOUS PRN
Status: DISCONTINUED | OUTPATIENT
Start: 2024-10-29 | End: 2024-10-31 | Stop reason: HOSPADM

## 2024-10-29 RX ADMIN — IOPAMIDOL 100 ML: 755 INJECTION, SOLUTION INTRAVENOUS at 22:07

## 2024-10-29 RX ADMIN — SODIUM CHLORIDE 1000 ML: 9 INJECTION, SOLUTION INTRAVENOUS at 22:13

## 2024-10-29 RX ADMIN — HYDROMORPHONE HYDROCHLORIDE 1 MG: 1 INJECTION, SOLUTION INTRAMUSCULAR; INTRAVENOUS; SUBCUTANEOUS at 22:16

## 2024-10-29 RX ADMIN — ONDANSETRON 4 MG: 2 INJECTION INTRAMUSCULAR; INTRAVENOUS at 22:15

## 2024-10-29 ASSESSMENT — PAIN - FUNCTIONAL ASSESSMENT
PAIN_FUNCTIONAL_ASSESSMENT: 0-10
PAIN_FUNCTIONAL_ASSESSMENT: ACTIVITIES ARE NOT PREVENTED

## 2024-10-29 ASSESSMENT — PAIN SCALES - GENERAL
PAINLEVEL_OUTOF10: 4
PAINLEVEL_OUTOF10: 4
PAINLEVEL_OUTOF10: 9
PAINLEVEL_OUTOF10: 8
PAINLEVEL_OUTOF10: 4

## 2024-10-29 ASSESSMENT — PAIN DESCRIPTION - ORIENTATION
ORIENTATION: MID;UPPER
ORIENTATION: RIGHT;UPPER;LEFT;MID

## 2024-10-29 ASSESSMENT — PAIN DESCRIPTION - DESCRIPTORS: DESCRIPTORS: CRAMPING

## 2024-10-29 ASSESSMENT — PAIN DESCRIPTION - PAIN TYPE: TYPE: ACUTE PAIN

## 2024-10-29 ASSESSMENT — PAIN DESCRIPTION - LOCATION
LOCATION: ABDOMEN
LOCATION: ABDOMEN

## 2024-10-30 ENCOUNTER — HOSPITAL ENCOUNTER (INPATIENT)
Facility: HOSPITAL | Age: 44
Discharge: HOME | DRG: 440 | End: 2024-11-02
Payer: COMMERCIAL

## 2024-10-30 PROBLEM — R73.9 HYPERGLYCEMIA: Status: ACTIVE | Noted: 2024-10-30

## 2024-10-30 PROBLEM — Z72.0 TOBACCO USE: Status: ACTIVE | Noted: 2024-10-30

## 2024-10-30 LAB
EST. AVERAGE GLUCOSE BLD GHB EST-MCNC: 183 MG/DL
HBA1C MFR BLD: 8 % (ref 4–5.6)
TRIGL SERPL-MCNC: 141 MG/DL

## 2024-10-30 PROCEDURE — 74183 MRI ABD W/O CNTR FLWD CNTR: CPT

## 2024-10-30 PROCEDURE — 6360000002 HC RX W HCPCS: Performed by: INTERNAL MEDICINE

## 2024-10-30 PROCEDURE — 2500000003 HC RX 250 WO HCPCS: Performed by: INTERNAL MEDICINE

## 2024-10-30 PROCEDURE — 6370000000 HC RX 637 (ALT 250 FOR IP): Performed by: INTERNAL MEDICINE

## 2024-10-30 PROCEDURE — 6360000004 HC RX CONTRAST MEDICATION: Performed by: RADIOLOGY

## 2024-10-30 PROCEDURE — A9575 INJ GADOTERATE MEGLUMI 0.1ML: HCPCS | Performed by: RADIOLOGY

## 2024-10-30 PROCEDURE — 94761 N-INVAS EAR/PLS OXIMETRY MLT: CPT

## 2024-10-30 PROCEDURE — 84478 ASSAY OF TRIGLYCERIDES: CPT

## 2024-10-30 PROCEDURE — 1100000000 HC RM PRIVATE

## 2024-10-30 PROCEDURE — 2580000003 HC RX 258: Performed by: INTERNAL MEDICINE

## 2024-10-30 PROCEDURE — 36415 COLL VENOUS BLD VENIPUNCTURE: CPT

## 2024-10-30 RX ORDER — GADOTERATE MEGLUMINE 376.9 MG/ML
20 INJECTION INTRAVENOUS
Status: COMPLETED | OUTPATIENT
Start: 2024-10-30 | End: 2024-10-30

## 2024-10-30 RX ORDER — NICOTINE 21 MG/24HR
1 PATCH, TRANSDERMAL 24 HOURS TRANSDERMAL DAILY
Status: DISCONTINUED | OUTPATIENT
Start: 2024-10-30 | End: 2024-10-31 | Stop reason: HOSPADM

## 2024-10-30 RX ADMIN — SODIUM CHLORIDE, PRESERVATIVE FREE 10 ML: 5 INJECTION INTRAVENOUS at 00:41

## 2024-10-30 RX ADMIN — ONDANSETRON 4 MG: 2 INJECTION INTRAMUSCULAR; INTRAVENOUS at 15:39

## 2024-10-30 RX ADMIN — SODIUM CHLORIDE, POTASSIUM CHLORIDE, SODIUM LACTATE AND CALCIUM CHLORIDE: 600; 310; 30; 20 INJECTION, SOLUTION INTRAVENOUS at 00:43

## 2024-10-30 RX ADMIN — SODIUM CHLORIDE, POTASSIUM CHLORIDE, SODIUM LACTATE AND CALCIUM CHLORIDE: 600; 310; 30; 20 INJECTION, SOLUTION INTRAVENOUS at 11:14

## 2024-10-30 RX ADMIN — SODIUM CHLORIDE, PRESERVATIVE FREE 10 ML: 5 INJECTION INTRAVENOUS at 07:37

## 2024-10-30 RX ADMIN — HYDROMORPHONE HYDROCHLORIDE 1 MG: 1 INJECTION, SOLUTION INTRAMUSCULAR; INTRAVENOUS; SUBCUTANEOUS at 18:04

## 2024-10-30 RX ADMIN — HYDROMORPHONE HYDROCHLORIDE 1 MG: 1 INJECTION, SOLUTION INTRAMUSCULAR; INTRAVENOUS; SUBCUTANEOUS at 14:21

## 2024-10-30 RX ADMIN — HYDROMORPHONE HYDROCHLORIDE 1 MG: 1 INJECTION, SOLUTION INTRAMUSCULAR; INTRAVENOUS; SUBCUTANEOUS at 01:07

## 2024-10-30 RX ADMIN — HYDROMORPHONE HYDROCHLORIDE 1 MG: 1 INJECTION, SOLUTION INTRAMUSCULAR; INTRAVENOUS; SUBCUTANEOUS at 11:14

## 2024-10-30 RX ADMIN — SODIUM CHLORIDE, POTASSIUM CHLORIDE, SODIUM LACTATE AND CALCIUM CHLORIDE: 600; 310; 30; 20 INJECTION, SOLUTION INTRAVENOUS at 09:19

## 2024-10-30 RX ADMIN — SODIUM CHLORIDE, POTASSIUM CHLORIDE, SODIUM LACTATE AND CALCIUM CHLORIDE: 600; 310; 30; 20 INJECTION, SOLUTION INTRAVENOUS at 15:37

## 2024-10-30 RX ADMIN — HYDROMORPHONE HYDROCHLORIDE 1 MG: 1 INJECTION, SOLUTION INTRAMUSCULAR; INTRAVENOUS; SUBCUTANEOUS at 04:35

## 2024-10-30 RX ADMIN — HYDROMORPHONE HYDROCHLORIDE 1 MG: 1 INJECTION, SOLUTION INTRAMUSCULAR; INTRAVENOUS; SUBCUTANEOUS at 07:35

## 2024-10-30 RX ADMIN — GADOTERATE MEGLUMINE 20 ML: 376.9 INJECTION INTRAVENOUS at 19:31

## 2024-10-30 ASSESSMENT — PAIN DESCRIPTION - DESCRIPTORS
DESCRIPTORS: ACHING;SORE
DESCRIPTORS: DISCOMFORT
DESCRIPTORS: ACHING;DISCOMFORT
DESCRIPTORS: DISCOMFORT
DESCRIPTORS: ACHING

## 2024-10-30 ASSESSMENT — PAIN DESCRIPTION - ORIENTATION
ORIENTATION: UPPER
ORIENTATION: UPPER
ORIENTATION: ANTERIOR

## 2024-10-30 ASSESSMENT — PAIN DESCRIPTION - ONSET: ONSET: GRADUAL

## 2024-10-30 ASSESSMENT — PAIN DESCRIPTION - LOCATION
LOCATION: ABDOMEN

## 2024-10-30 ASSESSMENT — PAIN SCALES - GENERAL
PAINLEVEL_OUTOF10: 4
PAINLEVEL_OUTOF10: 8
PAINLEVEL_OUTOF10: 5
PAINLEVEL_OUTOF10: 7
PAINLEVEL_OUTOF10: 4
PAINLEVEL_OUTOF10: 6
PAINLEVEL_OUTOF10: 8
PAINLEVEL_OUTOF10: 6
PAINLEVEL_OUTOF10: 6
PAINLEVEL_OUTOF10: 8
PAINLEVEL_OUTOF10: 3
PAINLEVEL_OUTOF10: 7

## 2024-10-30 ASSESSMENT — PAIN DESCRIPTION - FREQUENCY: FREQUENCY: INTERMITTENT

## 2024-10-30 ASSESSMENT — PAIN DESCRIPTION - PAIN TYPE
TYPE: ACUTE PAIN
TYPE: ACUTE PAIN

## 2024-10-30 ASSESSMENT — PAIN - FUNCTIONAL ASSESSMENT
PAIN_FUNCTIONAL_ASSESSMENT: ACTIVITIES ARE NOT PREVENTED
PAIN_FUNCTIONAL_ASSESSMENT: ACTIVITIES ARE NOT PREVENTED

## 2024-10-30 NOTE — PLAN OF CARE
Problem: Discharge Planning  Goal: Discharge to home or other facility with appropriate resources  Outcome: Progressing  Flowsheets (Taken 10/30/2024 0112 by Cathleen Barrera RN)  Discharge to home or other facility with appropriate resources:   Identify barriers to discharge with patient and caregiver   Arrange for needed discharge resources and transportation as appropriate   Refer to discharge planning if patient needs post-hospital services based on physician order or complex needs related to functional status, cognitive ability or social support system   Identify discharge learning needs (meds, wound care, etc)     Problem: Pain  Goal: Verbalizes/displays adequate comfort level or baseline comfort level  10/30/2024 0954 by Katja Multani  Outcome: Progressing     Problem: Skin/Tissue Integrity  Goal: Absence of new skin breakdown  Description: 1.  Monitor for areas of redness and/or skin breakdown  2.  Assess vascular access sites hourly  3.  Every 4-6 hours minimum:  Change oxygen saturation probe site  4.  Every 4-6 hours:  If on nasal continuous positive airway pressure, respiratory therapy assess nares and determine need for appliance change or resting period.  10/30/2024 0954 by Katja Multani  Outcome: Progressing     Problem: Gastrointestinal - Adult  Goal: Minimal or absence of nausea and vomiting  10/30/2024 0954 by Katja Multani  Outcome: Progressing     Problem: Gastrointestinal - Adult  Goal: Maintains or returns to baseline bowel function  Outcome: Progressing     Problem: Metabolic/Fluid and Electrolytes - Adult  Goal: Electrolytes maintained within normal limits  10/30/2024 0954 by Katja Multani  Outcome: Progressing

## 2024-10-30 NOTE — H&P
[facility]  [address] [place]  [address] [phone]    Hospital Medicine History and Physical      NAME:       Garcia Strong   :       1980   MRN:      [id number]     Date of service:   10/29/2024     Chief  Complaint:  Abdominal pain     History Of Presenting Illness:       Mr. Strong is a 44 y.o. male who is being admitted for a recurrent acute pancreatitis. Mr. Strong presented to our [facility] today complaining of a now persistent epigastric and LUQ aching pain, moderately severe. No nausea or vomiting. He had similar symptoms 10/26 for which he was seen at the ED but he declined admission. He was discharged home. He returned with worsening pain. He does says he socially drinks alcohol. No fever or chills. In the ED, CT scan abdomen and pelvis done showed acute uncomplicated pancreatitis. He will be admitted for further management.     Allergies   Allergen Reactions    Morphine Nausea And Vomiting     Patient states \"I can't take morphine; I throw up everywhere and I cant' take it.\"       Prior to Admission medications    Medication Sig Start Date End Date Taking? Authorizing Provider   omeprazole (PRILOSEC) 10 MG delayed release capsule Take 1 capsule by mouth daily   Yes Provider, MD Anali   olmesartan (BENICAR) 20 MG tablet TAKE 1 TABLET BY MOUTH EVERY DAY 10/25/24  Yes Isabel Puente PA   ondansetron (ZOFRAN-ODT) 4 MG disintegrating tablet Take 1 tablet by mouth 3 times daily as needed for Nausea or Vomiting 10/26/24   Louise Smith DO   triamcinolone (KENALOG) 0.1 % cream triamcinolone acetonide 0.1 % topical cream    Automatic Reconciliation, Ar       Past Medical History:   Diagnosis Date    Hemochromatosis     Liver disease     \"stage 3\"    Pancreatitis         History reviewed. No pertinent surgical

## 2024-10-30 NOTE — ED NOTES
TRANSFER - OUT REPORT:    Verbal report given to JAN Wilhelm on Garcia Strong  being transferred to [facility] 4th floor for routine progression of patient care       Report consisted of patient's Situation, Background, Assessment and   Recommendations(SBAR).     Information from the following report(s) ED SBAR, MAR, and Recent Results was reviewed with the receiving nurse.    [place] Assessment:                           Lines:   Peripheral IV 10/29/24 Right Antecubital (Active)   Site Assessment Clean, dry & intact 10/29/24 2144   Line Status Normal saline locked 10/29/24 2144   Phlebitis Assessment No symptoms 10/29/24 2144   Infiltration Assessment 0 10/29/24 2144   Dressing Status Clean, dry & intact 10/29/24 2144   Dressing Type Transparent 10/29/24 2144   Dressing Intervention New 10/29/24 2144        Opportunity for questions and clarification was provided.      Patient transported with:  Personal belongings

## 2024-10-30 NOTE — ED PROVIDER NOTES
management.  Decision regarding hospitalization.      Patient presenting with the above chief complaint.  He is mildly tachycardic upon arrival.  Otherwise vitals are stable.  Does have epigastric tenderness to palpation but overall fairly benign abdominal exam with a nonsurgical abdomen.  Patient is agreeable to repeat labs as well as imaging and admission at this time.  We treated symptomatically in the meantime pending his results.      REASSESSMENT      Perfect Serve Consult for Admission  10:32 PM    ED Room Number: WER02/02  Patient Name and age:  Garcia Strong 44 y.o.  male  Working Diagnosis:   1. Acute pancreatitis, unspecified complication status, unspecified pancreatitis type    2. Abdominal pain, epigastric    3. Nausea and vomiting, unspecified vomiting type        COVID-19 Suspicion: No  Sepsis present:  No    Code Status:  Full Code  Readmission: No  Isolation Requirements: no  Recommended Level of Care: tele  Department: [facility] - [phone]    Other: Patient presents with continued epigastric pain.  Diagnosed with pancreatitis 2 days ago.  Lipase of 1900 at that time.  Refused imaging at that time as well as admission.  Did get a CT today which shows acute pancreatitis but no fluid collections.  Prior ultrasounds have not shown gallstones and patient has had multiple episodes of pancreatitis in the past.  Hemodynamically stable.       CONSULTS:  None    PROCEDURES:     Procedures          (Please note that portions of this note were completed with a voice recognition program.  Efforts were made to edit the dictations but occasionally words are mis-transcribed.)    Fransisco Short MD (electronically signed)  Emergency Attending Physician              Fransisco Short MD  10/29/24 9863

## 2024-10-30 NOTE — ED NOTES
Pt arrived with c/o of reoccurring epigastric pain. Seen on 10/26/24 and dx with acute pancreatitis. Declined admission at time. Returned today due to uncontrolled epigastric pain and admitted for acute epigastric pain.     SBAR given to [facility]. Pt transported via [facility] in stable condition and with all personal belongings.

## 2024-10-30 NOTE — PLAN OF CARE
Problem: Pain  Goal: Verbalizes/displays adequate comfort level or baseline comfort level  Outcome: Progressing     Problem: Skin/Tissue Integrity  Goal: Absence of new skin breakdown  Description: 1.  Monitor for areas of redness and/or skin breakdown  2.  Assess vascular access sites hourly  3.  Every 4-6 hours minimum:  Change oxygen saturation probe site  4.  Every 4-6 hours:  If on nasal continuous positive airway pressure, respiratory therapy assess nares and determine need for appliance change or resting period.  Outcome: Progressing     Problem: Gastrointestinal - Adult  Goal: Minimal or absence of nausea and vomiting  Outcome: Progressing     Problem: Metabolic/Fluid and Electrolytes - Adult  Goal: Electrolytes maintained within normal limits  Outcome: Progressing

## 2024-10-30 NOTE — ED NOTES
Pt resting comfortably on stretcher, watching tv. Reports some relief of pain at this time. Rating pain 4/10. Pt appears satisfied. Updated with plan of care.

## 2024-10-30 NOTE — CONSULTS
25 21 - 32 mmol/L    Anion Gap 11 2 - 12 mmol/L    Glucose 280 (H) 65 - 100 mg/dL    BUN 8 6 - 20 MG/DL    Creatinine 1.02 0.70 - 1.30 MG/DL    BUN/Creatinine Ratio 8 (L) 12 - 20      Est, Glom Filt Rate >90 >60 ml/min/1.73m2    Calcium 9.2 8.5 - 10.1 MG/DL    Total Bilirubin 1.2 (H) 0.2 - 1.0 MG/DL    ALT 96 (H) 12 - 78 U/L     (H) 15 - 37 U/L    Alk Phosphatase 213 (H) 45 - 117 U/L    Total Protein 8.0 6.4 - 8.2 g/dL    Albumin 3.7 3.5 - 5.0 g/dL    Globulin 4.3 (H) 2.0 - 4.0 g/dL    Albumin/Globulin Ratio 0.9 (L) 1.1 - 2.2     Lipase    Collection Time: 10/29/24  9:44 PM   Result Value Ref Range    Lipase 2,192 (H) 13 - 75 U/L   Extra Tubes Hold    Collection Time: 10/29/24  9:44 PM   Result Value Ref Range    Specimen HOld 1 RED 1 SST     Comment:        Add-on orders for these samples will be processed based on acceptable specimen integrity and analyte stability, which may vary by analyte.   Triglyceride    Collection Time: 10/30/24  2:12 AM   Result Value Ref Range    Triglycerides 141 <150 MG/DL         Assessment/Plan:     Principal Problem:    Recurrent acute pancreatitis  Active Problems:    Hereditary hemochromatosis (HCC)    Hyperglycemia    Tobacco use  Resolved Problems:    * No resolved hospital problems. *       See above narrative for full detail.

## 2024-10-30 NOTE — ED TRIAGE NOTES
Pt ambulatory to treatment area c/o persistent epigastric abdominal pain.   Pt states he was offered admission over the weekend at this ED for acute pancreatitis and refused at that time. Pt states his symptoms have not resolved while at home and would now like to be admitted.

## 2024-10-31 VITALS
WEIGHT: 250.88 LBS | RESPIRATION RATE: 15 BRPM | TEMPERATURE: 98.2 F | HEIGHT: 71 IN | OXYGEN SATURATION: 98 % | BODY MASS INDEX: 35.12 KG/M2 | DIASTOLIC BLOOD PRESSURE: 93 MMHG | SYSTOLIC BLOOD PRESSURE: 142 MMHG | HEART RATE: 90 BPM

## 2024-10-31 LAB
ALBUMIN SERPL-MCNC: 3.4 G/DL (ref 3.5–5)
ALBUMIN/GLOB SERPL: 1.1 (ref 1.1–2.2)
ALP SERPL-CCNC: 157 U/L (ref 45–117)
ALT SERPL-CCNC: 83 U/L (ref 12–78)
ANION GAP SERPL CALC-SCNC: 5 MMOL/L (ref 2–12)
AST SERPL-CCNC: 64 U/L (ref 15–37)
BASOPHILS # BLD: 0.1 K/UL (ref 0–0.1)
BASOPHILS NFR BLD: 1 % (ref 0–1)
BILIRUB SERPL-MCNC: 0.6 MG/DL (ref 0.2–1)
BUN SERPL-MCNC: 6 MG/DL (ref 6–20)
BUN/CREAT SERPL: 10 (ref 12–20)
CALCIUM SERPL-MCNC: 8.9 MG/DL (ref 8.5–10.1)
CHLORIDE SERPL-SCNC: 106 MMOL/L (ref 97–108)
CO2 SERPL-SCNC: 27 MMOL/L (ref 21–32)
CREAT SERPL-MCNC: 0.59 MG/DL (ref 0.7–1.3)
DIFFERENTIAL METHOD BLD: NORMAL
EOSINOPHIL # BLD: 0.3 K/UL (ref 0–0.4)
EOSINOPHIL NFR BLD: 6 % (ref 0–7)
ERYTHROCYTE [DISTWIDTH] IN BLOOD BY AUTOMATED COUNT: 12.5 % (ref 11.5–14.5)
GLOBULIN SER CALC-MCNC: 3.1 G/DL (ref 2–4)
GLUCOSE SERPL-MCNC: 147 MG/DL (ref 65–100)
HCT VFR BLD AUTO: 40.2 % (ref 36.6–50.3)
HGB BLD-MCNC: 13.9 G/DL (ref 12.1–17)
IMM GRANULOCYTES # BLD AUTO: 0 K/UL (ref 0–0.04)
IMM GRANULOCYTES NFR BLD AUTO: 0 % (ref 0–0.5)
LYMPHOCYTES # BLD: 1.9 K/UL (ref 0.8–3.5)
LYMPHOCYTES NFR BLD: 31 % (ref 12–49)
MCH RBC QN AUTO: 31.3 PG (ref 26–34)
MCHC RBC AUTO-ENTMCNC: 34.6 G/DL (ref 30–36.5)
MCV RBC AUTO: 90.5 FL (ref 80–99)
MONOCYTES # BLD: 0.5 K/UL (ref 0–1)
MONOCYTES NFR BLD: 9 % (ref 5–13)
NEUTS SEG # BLD: 3.3 K/UL (ref 1.8–8)
NEUTS SEG NFR BLD: 53 % (ref 32–75)
NRBC # BLD: 0 K/UL (ref 0–0.01)
NRBC BLD-RTO: 0 PER 100 WBC
PLATELET # BLD AUTO: 225 K/UL (ref 150–400)
PMV BLD AUTO: 9.9 FL (ref 8.9–12.9)
POTASSIUM SERPL-SCNC: 4.3 MMOL/L (ref 3.5–5.1)
PROT SERPL-MCNC: 6.5 G/DL (ref 6.4–8.2)
RBC # BLD AUTO: 4.44 M/UL (ref 4.1–5.7)
SODIUM SERPL-SCNC: 138 MMOL/L (ref 136–145)
WBC # BLD AUTO: 6.2 K/UL (ref 4.1–11.1)

## 2024-10-31 PROCEDURE — 6360000002 HC RX W HCPCS: Performed by: INTERNAL MEDICINE

## 2024-10-31 PROCEDURE — 6370000000 HC RX 637 (ALT 250 FOR IP): Performed by: INTERNAL MEDICINE

## 2024-10-31 PROCEDURE — 36415 COLL VENOUS BLD VENIPUNCTURE: CPT

## 2024-10-31 PROCEDURE — 94761 N-INVAS EAR/PLS OXIMETRY MLT: CPT

## 2024-10-31 PROCEDURE — 82787 IGG 1 2 3 OR 4 EACH: CPT

## 2024-10-31 PROCEDURE — 2580000003 HC RX 258: Performed by: INTERNAL MEDICINE

## 2024-10-31 PROCEDURE — 2500000003 HC RX 250 WO HCPCS: Performed by: INTERNAL MEDICINE

## 2024-10-31 PROCEDURE — 80053 COMPREHEN METABOLIC PANEL: CPT

## 2024-10-31 PROCEDURE — 85025 COMPLETE CBC W/AUTO DIFF WBC: CPT

## 2024-10-31 RX ORDER — DOCUSATE SODIUM 100 MG/1
100 CAPSULE, LIQUID FILLED ORAL DAILY PRN
Qty: 30 CAPSULE | Refills: 0 | Status: SHIPPED | OUTPATIENT
Start: 2024-10-31 | End: 2024-11-30

## 2024-10-31 RX ORDER — NICOTINE 21 MG/24HR
1 PATCH, TRANSDERMAL 24 HOURS TRANSDERMAL DAILY
Qty: 30 PATCH | Refills: 0 | Status: SHIPPED | OUTPATIENT
Start: 2024-11-01

## 2024-10-31 RX ORDER — OXYCODONE HYDROCHLORIDE 5 MG/1
5 TABLET ORAL EVERY 6 HOURS PRN
Qty: 12 TABLET | Refills: 0 | Status: SHIPPED | OUTPATIENT
Start: 2024-10-31 | End: 2024-11-03

## 2024-10-31 RX ORDER — LOSARTAN POTASSIUM 50 MG/1
50 TABLET ORAL DAILY
Status: DISCONTINUED | OUTPATIENT
Start: 2024-10-31 | End: 2024-10-31 | Stop reason: HOSPADM

## 2024-10-31 RX ADMIN — SODIUM CHLORIDE, PRESERVATIVE FREE 10 ML: 5 INJECTION INTRAVENOUS at 09:17

## 2024-10-31 RX ADMIN — SODIUM CHLORIDE, POTASSIUM CHLORIDE, SODIUM LACTATE AND CALCIUM CHLORIDE: 600; 310; 30; 20 INJECTION, SOLUTION INTRAVENOUS at 00:20

## 2024-10-31 RX ADMIN — SODIUM CHLORIDE, POTASSIUM CHLORIDE, SODIUM LACTATE AND CALCIUM CHLORIDE: 600; 310; 30; 20 INJECTION, SOLUTION INTRAVENOUS at 07:43

## 2024-10-31 RX ADMIN — HYDROMORPHONE HYDROCHLORIDE 0.5 MG: 1 INJECTION, SOLUTION INTRAMUSCULAR; INTRAVENOUS; SUBCUTANEOUS at 01:42

## 2024-10-31 RX ADMIN — LOSARTAN POTASSIUM 50 MG: 50 TABLET, FILM COATED ORAL at 15:03

## 2024-10-31 RX ADMIN — HYDROMORPHONE HYDROCHLORIDE 1 MG: 1 INJECTION, SOLUTION INTRAMUSCULAR; INTRAVENOUS; SUBCUTANEOUS at 05:04

## 2024-10-31 RX ADMIN — HYDROMORPHONE HYDROCHLORIDE 1 MG: 1 INJECTION, SOLUTION INTRAMUSCULAR; INTRAVENOUS; SUBCUTANEOUS at 11:28

## 2024-10-31 ASSESSMENT — PAIN - FUNCTIONAL ASSESSMENT: PAIN_FUNCTIONAL_ASSESSMENT: ACTIVITIES ARE NOT PREVENTED

## 2024-10-31 ASSESSMENT — PAIN DESCRIPTION - ORIENTATION
ORIENTATION: UPPER;RIGHT;LEFT
ORIENTATION: UPPER
ORIENTATION: UPPER

## 2024-10-31 ASSESSMENT — PAIN SCALES - GENERAL
PAINLEVEL_OUTOF10: 6
PAINLEVEL_OUTOF10: 5
PAINLEVEL_OUTOF10: 7
PAINLEVEL_OUTOF10: 3

## 2024-10-31 ASSESSMENT — PAIN DESCRIPTION - LOCATION
LOCATION: ABDOMEN

## 2024-10-31 ASSESSMENT — PAIN DESCRIPTION - PAIN TYPE: TYPE: ACUTE PAIN

## 2024-10-31 ASSESSMENT — PAIN DESCRIPTION - FREQUENCY: FREQUENCY: CONTINUOUS

## 2024-10-31 ASSESSMENT — PAIN DESCRIPTION - ONSET: ONSET: GRADUAL

## 2024-10-31 ASSESSMENT — PAIN DESCRIPTION - DESCRIPTORS
DESCRIPTORS: SORE;DISCOMFORT
DESCRIPTORS: ACHING

## 2024-10-31 ASSESSMENT — PAIN SCALES - WONG BAKER: WONGBAKER_NUMERICALRESPONSE: NO HURT

## 2024-10-31 NOTE — PLAN OF CARE
Problem: Discharge Planning  Goal: Discharge to home or other facility with appropriate resources  10/31/2024 1121 by Randi Alberts RN  Outcome: [facility] Progressing  10/31/2024 0258 by Cathleen Barrera RN  Outcome: Progressing  Flowsheets (Taken 10/30/2024 1957)  Discharge to home or other facility with appropriate resources:   Identify barriers to discharge with patient and caregiver   Arrange for needed discharge resources and transportation as appropriate   Identify discharge learning needs (meds, wound care, etc)   Refer to discharge planning if patient needs post-hospital services based on physician order or complex needs related to functional status, cognitive ability or social support system     Problem: Pain  Goal: Verbalizes/displays adequate comfort level or baseline comfort level  10/31/2024 1121 by Randi Alberts RN  Outcome: [facility] Progressing  10/31/2024 0258 by Cathleen Barrera RN  Outcome: Progressing     Problem: Skin/Tissue Integrity  Goal: Absence of new skin breakdown  Description: 1.  Monitor for areas of redness and/or skin breakdown  2.  Assess vascular access sites hourly  3.  Every 4-6 hours minimum:  Change oxygen saturation probe site  4.  Every 4-6 hours:  If on nasal continuous positive airway pressure, respiratory therapy assess nares and determine need for appliance change or resting period.  10/31/2024 1121 by Randi Alberts RN  Outcome: [facility] Progressing  10/31/2024 0258 by Cathleen Barrera RN  Outcome: Progressing     Problem: Gastrointestinal - Adult  Goal: Minimal or absence of nausea and vomiting  10/31/2024 1121 by Randi Alberts RN  Outcome: [facility] Progressing  10/31/2024 0258 by Cathleen Barrera RN  Outcome: Progressing  Goal: Maintains or returns to baseline bowel function  Outcome: [facility] Progressing     Problem: Metabolic/Fluid and Electrolytes - Adult  Goal: Electrolytes maintained within normal limits  10/31/2024 1121 by Randi Alberts RN  Outcome: [facility]

## 2024-10-31 NOTE — CARE COORDINATION
Care Management Progress Note        Reason for Admission:   Abdominal pain, epigastric [R10.13]  Recurrent acute pancreatitis [K85.90]  Acute pancreatitis, unspecified complication status, unspecified pancreatitis type [K85.90]  Nausea and vomiting, unspecified vomiting type [R11.2]         Patient Admission Status: Inpatient  RUR: 6%  Hospitalization in the last 30 days (Readmission):  No        Transition of care plan:  Patient was discussed in IDR and continues to be medically managed. Patient is scheduled for an ERCP today.    Dispo: return home. CM needs are not anticipated at this time, please consult CM if needs arise.    Date 1st IMM letter given: n/a. Patient has a commercial insurance.     Outpatient follow-up.    Transport at discharge: family.        ___________________________________________   Ashley Gordon RN Case Manager  10/31/2024   3:11 PM

## 2024-10-31 NOTE — DISCHARGE INSTRUCTIONS
HOSPITALIST DISCHARGE INSTRUCTIONS  NAME: Garcia Strong   :  1980   MRN:  [id number]     Date/Time:  10/31/2024 1:54 PM    ADMIT DATE: 10/29/2024     DISCHARGE DATE: 10/31/2024     ADMITTING DIAGNOSIS:  Pancreatitis     DISCHARGE DIAGNOSIS:  Pancreatitis   Diabetes       Pancreatitis: Care Instructions  Overview     The pancreas is an organ behind the stomach. It makes hormones and enzymes to help your body digest food.  But if these enzymes attack the pancreas, it can get inflamed. This is called pancreatitis. Most cases are caused by gallstones or by heavy alcohol use.  If you take care of yourself at home, it will help you get better. It will also help you avoid more problems with your pancreas.  Follow-up care is a key part of your treatment and safety. Be sure to make and go to all appointments, and call your doctor if you are having problems. It's also a good idea to know your test results and keep a list of the medicines you take.  How can you care for yourself at home?  Drink clear liquids and eat bland foods until you feel better. [place] foods include rice, dry toast, and crackers. They also include bananas and applesauce.  Eat a low-fat diet until your doctor says your pancreas is healed.  If you drink alcohol, quit or cut back as much as you can. It's safest not to use it at all. Tell your doctor if you need help to quit. Counseling, support groups, and sometimes medicines can help.  Be safe with medicines. Read and follow all instructions on the label.  If the doctor gave you a prescription medicine for pain, take it as prescribed.  If you are not taking a prescription pain medicine, ask your doctor if you can take an over-the-counter medicine.  If your doctor prescribed antibiotics, take them as directed. Do not stop taking them just because you feel better. You need to take the full course of antibiotics.  Get extra rest until you feel better.  To prevent future problems with your pancreas  Avoid

## 2024-10-31 NOTE — PROGRESS NOTES
Jessica Vega PA-C                       [phone] office             Monday-Friday 8:00 am-4:30 pm  I am not permitted to use \"perfect serve\" use above for contact, thanks.        Gastroenterology Progress Note    October 31, 2024  Admit Date: 10/29/2024         Narrative Assessment and Plan   44 y.o. male being followed by GI for acute recurrent pancreatitis. MRCP showed two pancreatic ductal stones, 4 mm and 7 mm, with pancreatic ductal dilation. No CBD stones or CBD dilation. WBC has improved from 12.8 on admission to 6.2 today.     Impression:  Pancreatic duct stones  Recurrent pancreatitis due to above     Plan:  Recommend outpatient follow up with [facility] for further evaluation of ESWL vs pancreatic duct stenting. Dr. Sotelo has contacted our office to request follow up with Dr. Díaz.   The patient can be discharged from a GI standpoint once tolerating liquid diet.   Once he slowly advances to a solid diet at home, he should follow low-fat diet for 2 weeks.   I recommend strict avoidance of alcohol, and have discussed this with the patient.     Jessica Vega PA-C    This clinical problem (pancreatic ductal stones) is not one with which I have experience and it would be best if it were addressed at an alternate center.  I have arranged appropriate follow up to begin that process.  Houston Sotelo MD     Subjective:   Chief Complaint: follow up abdominal pain      HPI: His abdominal pain has improved some from yesterday to today, and he is interested in trying some food today. No nausea or vomiting. He has been trying to go longer periods of time without pain medication.     ROS:  The previous review of systems on initial consultation / H&P is noted and reviewed.  Specific changes noted above in HPI.    Current Medications:     Current Facility-Administered Medications   Medication Dose Route Frequency 
HENNY ZHANG [facility]  [address] [place] [address] [phone]      Medical Progress Note      NAME: Garcia Strong   :  1980  MRM:  [id number]    Date/Time of service: 10/30/2024         Subjective:     Chief Complaint:  Patient was personally seen and examined by me during this time period.  Chart reviewed.  Fu pancreatitis. Endorses abd pain; no n/v          Objective:       Vitals:       Last 24hrs VS reviewed since prior progress note. Most recent are:    Vitals:    10/30/24 1116   BP: (!) 145/99   Pulse: 65   Resp: 16   Temp: 97.3 °F (36.3 °C)   SpO2: 98%     SpO2 Readings from Last 6 Encounters:   10/30/24 98%   10/26/24 100%   24 98%   24 98%   08/10/23 97%        No intake or output data in the 24 hours ending 10/30/24 1249     Exam:     Physical Exam:    Gen:  Well-developed, well-nourished, in no acute distress  HEENT:  Pink conjunctivae, PERRL, hearing intact to voice  Resp:  No accessory muscle use, clear breath sounds without wheezes rales or rhonchi  Card:  RRR, No murmurs, normal S1, S2, no peripheral edema  Abd:  epigastric TTP   Musc:  No cyanosis or clubbing  Skin:  No rashes or ulcers, skin turgor is good  Neuro:  Cranial nerves 3-12 are grossly intact,  follows commands appropriately  Psych:  Oriented to person, place, and time, Alert with good insight      Medications Reviewed: (see below)    Lab Data Reviewed: (see below)    ______________________________________________________________________    Medications:     Current Facility-Administered Medications   Medication Dose Route Frequency    nicotine (NICODERM CQ) 21 MG/24HR 1 patch  1 patch TransDERmal Daily    lactated ringers infusion   IntraVENous Continuous    sodium chloride flush 0.9 % injection 5-40 mL  5-40 mL IntraVENous 2 times per day    sodium chloride flush 0.9 % injection 5-40 mL  5-40 mL IntraVENous PRN    0.9 % sodium chloride infusion   IntraVENous PRN    ondansetron 
NUTRITION    Best practice alert was triggered based on results obtained during nursing admission assessment for Weight loss 14-23#.    Wt Readings from Last 10 Encounters:   10/30/24 113.8 kg (250 lb 14.1 oz)   10/26/24 114.7 kg (252 lb 13.9 oz)   08/12/24 117.9 kg (260 lb)   02/12/24 117 kg (258 lb)   08/10/23 120 kg (264 lb 9.6 oz)   09/06/22 118.8 kg (262 lb)   05/26/22 118 kg (260 lb 1.6 oz)   11/18/21 121.6 kg (268 lb)   09/28/21 116.6 kg (257 lb)        The patient's chart was reviewed and nutrition assessment is not indicated at this time. Patient was counseled to lose weight by Hepatology outpatient; has lost ~14# x 1 year. Not clinically significant for timeframe. Plan to see patient for rescreen per policy.  Thank you.     Ethel Pruitt MS, RD, [facility]  Ext: [phone], or via [employer]      
Nurse handed patient a copy of discharge instructions which have been read and explained to patient. New medications were read and explained to patient, patient verbalized understanding. Patient aware that prescriptions have been electronically sent to their pharmacy. Opportunity for questions and clarification offered. Removed patient's IV access with no complications. Vital signs stable. Patient sent with all belongings.      
Implemented All Universal Safety Interventions:  [place] to call system. Call bell, personal items and telephone within reach. Instruct patient to call for assistance. Room bathroom lighting operational. Non-slip footwear when patient is off stretcher. Physically safe environment: no spills, clutter or unnecessary equipment. Stretcher in lowest position, wheels locked, appropriate side rails in place.

## 2024-10-31 NOTE — DISCHARGE SUMMARY
small fluid tracking along the posterior  margin of the uncinate and duodenum. 5 mm pancreatic ductal dilation with 4 and  7 mm calculi within the duct just upstream from the terminus. No mass.  ADRENALS: Unremarkable.  KIDNEYS: No enhancing mass, hydronephrosis, or other abnormality.  STOMACH: Unremarkable.  VISUALIZED BOWEL: No dilatation or wall thickening.  PERITONEUM: No ascites or pneumoperitoneum.  RETROPERITONEUM: No lymphadenopathy or aortic aneurysm.  VISUALIZED PELVIS: Unremarkable.  BONES AND SOFT TISSUES: Degenerative spine change with no acute fracture or  aggressive lesion.  ADDITIONAL COMMENTS: N/A     IMPRESSION:  Acute uncomplicated pancreatitis with pancreatic ductal stones and  mild pancreatic ductal dilation as above.    Discharge Exam:  Physical Exam:    Gen:  Well-developed, well-nourished, in no acute distress  HEENT:  Pink conjunctivae, PERRL, hearing intact to voice  Resp:  No accessory muscle use, clear breath sounds without wheezes rales or rhonchi  Card:  RRR, No murmurs, normal S1, S2, no peripheral edema  Abd:  epigastric TTP   Musc:  No cyanosis or clubbing  Skin:  No rashes or ulcers, skin turgor is good  Neuro:  Cranial nerves 3-12 are grossly intact,  follows commands appropriately  Psych:  Oriented to person, place, and time, Alert with good insight      Disposition: home  Discharge Condition: Stable    Patient Instructions:      Medication List        START taking these medications      docusate sodium 100 MG capsule  Commonly known as: COLACE  Take 1 capsule by mouth daily as needed for Constipation     metFORMIN 500 MG tablet  Commonly known as: GLUCOPHAGE  Take 1 tablet by mouth 2 times daily (with meals)     naloxone 4 MG/0.1ML Liqd nasal spray  Commonly known as: Narcan  1 spray by Nasal route as needed for Opioid Reversal     nicotine 21 MG/24HR  Commonly known as: NICODERM CQ  Place 1 patch onto the skin daily  Start taking on: November 1, 2024            CHANGE how you

## 2024-10-31 NOTE — PLAN OF CARE
Problem: Discharge Planning  Goal: Discharge to home or other facility with appropriate resources  Outcome: Progressing  Flowsheets (Taken 10/30/2024 1957)  Discharge to home or other facility with appropriate resources:   Identify barriers to discharge with patient and caregiver   Arrange for needed discharge resources and transportation as appropriate   Identify discharge learning needs (meds, wound care, etc)   Refer to discharge planning if patient needs post-hospital services based on physician order or complex needs related to functional status, cognitive ability or social support system     Problem: Pain  Goal: Verbalizes/displays adequate comfort level or baseline comfort level  Outcome: Progressing     Problem: Skin/Tissue Integrity  Goal: Absence of new skin breakdown  Description: 1.  Monitor for areas of redness and/or skin breakdown  2.  Assess vascular access sites hourly  3.  Every 4-6 hours minimum:  Change oxygen saturation probe site  4.  Every 4-6 hours:  If on nasal continuous positive airway pressure, respiratory therapy assess nares and determine need for appliance change or resting period.  Outcome: Progressing     Problem: Gastrointestinal - Adult  Goal: Minimal or absence of nausea and vomiting  Outcome: Progressing     Problem: Metabolic/Fluid and Electrolytes - Adult  Goal: Electrolytes maintained within normal limits  Outcome: Progressing  Flowsheets (Taken 10/30/2024 1957)  Electrolytes maintained within normal limits:   Monitor labs and assess patient for signs and symptoms of electrolyte imbalances   Administer electrolyte replacement as ordered   Monitor response to electrolyte replacements, including repeat lab results as appropriate   Instruct patient on fluid and nutrition restrictions as appropriate

## 2024-11-02 LAB — IGG4 SER-MCNC: 96 MG/DL (ref 2–96)

## 2025-01-21 RX ORDER — OLMESARTAN MEDOXOMIL 20 MG/1
20 TABLET ORAL DAILY
Qty: 90 TABLET | Refills: 0 | Status: SHIPPED | OUTPATIENT
Start: 2025-01-21

## 2025-02-17 ENCOUNTER — TELEMEDICINE (OUTPATIENT)
Age: 45
End: 2025-02-17
Payer: COMMERCIAL

## 2025-02-17 DIAGNOSIS — E83.110 HEREDITARY HEMOCHROMATOSIS: Primary | ICD-10-CM

## 2025-02-17 DIAGNOSIS — R73.09 ELEVATED HEMOGLOBIN A1C: ICD-10-CM

## 2025-02-17 PROCEDURE — 99214 OFFICE O/P EST MOD 30 MIN: CPT | Performed by: PHYSICIAN ASSISTANT

## 2025-02-17 RX ORDER — OMEPRAZOLE 40 MG/1
CAPSULE, DELAYED RELEASE ORAL
COMMUNITY
Start: 2025-01-30

## 2025-02-17 ASSESSMENT — ANXIETY QUESTIONNAIRES
3. WORRYING TOO MUCH ABOUT DIFFERENT THINGS: NOT AT ALL
4. TROUBLE RELAXING: NOT AT ALL
5. BEING SO RESTLESS THAT IT IS HARD TO SIT STILL: NOT AT ALL
IF YOU CHECKED OFF ANY PROBLEMS ON THIS QUESTIONNAIRE, HOW DIFFICULT HAVE THESE PROBLEMS MADE IT FOR YOU TO DO YOUR WORK, TAKE CARE OF THINGS AT HOME, OR GET ALONG WITH OTHER PEOPLE: NOT DIFFICULT AT ALL
2. NOT BEING ABLE TO STOP OR CONTROL WORRYING: NOT AT ALL
1. FEELING NERVOUS, ANXIOUS, OR ON EDGE: NOT AT ALL
6. BECOMING EASILY ANNOYED OR IRRITABLE: NOT AT ALL
7. FEELING AFRAID AS IF SOMETHING AWFUL MIGHT HAPPEN: NOT AT ALL
GAD7 TOTAL SCORE: 0

## 2025-02-17 ASSESSMENT — PATIENT HEALTH QUESTIONNAIRE - PHQ9
SUM OF ALL RESPONSES TO PHQ QUESTIONS 1-9: 0
SUM OF ALL RESPONSES TO PHQ9 QUESTIONS 1 & 2: 0
DEPRESSION UNABLE TO ASSESS: FUNCTIONAL CAPACITY MOTIVATION LIMITS ACCURACY
SUM OF ALL RESPONSES TO PHQ QUESTIONS 1-9: 0
1. LITTLE INTEREST OR PLEASURE IN DOING THINGS: NOT AT ALL
2. FEELING DOWN, DEPRESSED OR HOPELESS: NOT AT ALL

## 2025-02-17 NOTE — PROGRESS NOTES
[facility]      Elias Villar MD, FACP, FACG, FAASLD      ADELIA Hernandez-JESSY Ramey, PCNP-BC   Neisha Torrespadminialec, ACNPC-   Yadira Jeong, [facility]  Manuel Blake, FNP-C   Kaylen Hatfield, AGPCNP-BC   Vicki Russell, [facility]   [address], [address]   [place]  [address] [phone]   FAX: [phone]  [facility]   [address], [address]   [place]  [address] [phone]   FAX: [phone]     Patient Care Team:  Deandre Alvarez PA as PCP - General  Deandre Alvarez PA as PCP - Empaneled Provider    Patient Active Problem List   Diagnosis    Olecranon bursitis of right elbow    Elevated LFTs    Hereditary hemochromatosis (HCC)    Hidradenitis    Recurrent acute pancreatitis    Hyperglycemia    Tobacco use     Garcia Strong, was evaluated through a phone call only.   The patient was located at Home: [address] [place] [address]  Provider was located at Facility (Appt Dept): [address] [place] [address]  [place] [address] [place] [id number]  Confirm you are appropriately licensed, registered, or certified to deliver care in the state where the patient is located as indicated above. If you are not or unsure, please re-schedule the visit: Yes, I confirm.      Total time spent for this encounter: Not billed by time    --ADELIA Hernandez on 2/17/2025 at 10:53 AM    An electronic signature was used to authenticate this note.      Garcia Strong is being seen at [facility] for management of hereditary hemochromatosis. The active problem list, all pertinent past medical history, medications, radiologic findings and laboratory findings related to the liver disorder were reviewed and discussed with the patient.     The patient is a 44 y.o.  male who was found to

## 2025-02-17 NOTE — PROGRESS NOTES
Chief Complaint   Patient presents with    Follow-up     N/A     There were no vitals filed for this visit.  .  \"Have you been to the ER, urgent care clinic since your last visit?  Hospitalized since your last visit?\"    YES - When: approximately 1/2025 ago.  Where and Why: surgery for acute pancreatitis .    “Have you seen or consulted any other health care providers outside of [facility] since your last visit?”    NO            Click Here for Release of Records Request

## 2025-04-28 RX ORDER — OLMESARTAN MEDOXOMIL 20 MG/1
20 TABLET ORAL DAILY
Qty: 90 TABLET | Refills: 0 | Status: SHIPPED | OUTPATIENT
Start: 2025-04-28